# Patient Record
Sex: FEMALE | Race: WHITE | NOT HISPANIC OR LATINO | Employment: FULL TIME | URBAN - METROPOLITAN AREA
[De-identification: names, ages, dates, MRNs, and addresses within clinical notes are randomized per-mention and may not be internally consistent; named-entity substitution may affect disease eponyms.]

---

## 2023-03-28 DIAGNOSIS — I10 HYPERTENSION, UNSPECIFIED TYPE: ICD-10-CM

## 2023-03-28 RX ORDER — SPIRONOLACTONE 50 MG/1
50 TABLET, FILM COATED ORAL DAILY
COMMUNITY
Start: 2020-05-14 | End: 2023-03-28 | Stop reason: SDUPTHER

## 2023-03-29 RX ORDER — SPIRONOLACTONE 50 MG/1
50 TABLET, FILM COATED ORAL DAILY
Qty: 90 TABLET | Refills: 1 | Status: SHIPPED | OUTPATIENT
Start: 2023-03-29 | End: 2023-08-08 | Stop reason: SDUPTHER

## 2023-05-04 LAB
ANION GAP IN SER/PLAS: 13 MMOL/L (ref 10–20)
CALCIUM (MG/DL) IN SER/PLAS: 10.8 MG/DL (ref 8.6–10.6)
CARBON DIOXIDE, TOTAL (MMOL/L) IN SER/PLAS: 31 MMOL/L (ref 21–32)
CHLORIDE (MMOL/L) IN SER/PLAS: 96 MMOL/L (ref 98–107)
CREATININE (MG/DL) IN SER/PLAS: 1.11 MG/DL (ref 0.5–1.05)
GFR FEMALE: 54 ML/MIN/1.73M2
GLUCOSE (MG/DL) IN SER/PLAS: 118 MG/DL (ref 74–99)
POTASSIUM (MMOL/L) IN SER/PLAS: 4.3 MMOL/L (ref 3.5–5.3)
SODIUM (MMOL/L) IN SER/PLAS: 136 MMOL/L (ref 136–145)
UREA NITROGEN (MG/DL) IN SER/PLAS: 20 MG/DL (ref 6–23)

## 2023-08-07 DIAGNOSIS — E78.2 MIXED HYPERLIPIDEMIA: Primary | ICD-10-CM

## 2023-08-07 RX ORDER — LOVASTATIN 20 MG/1
20 TABLET ORAL NIGHTLY
COMMUNITY
End: 2023-08-07 | Stop reason: SDUPTHER

## 2023-08-08 ENCOUNTER — OFFICE VISIT (OUTPATIENT)
Dept: PRIMARY CARE | Facility: CLINIC | Age: 68
End: 2023-08-08
Payer: MEDICARE

## 2023-08-08 VITALS
DIASTOLIC BLOOD PRESSURE: 78 MMHG | SYSTOLIC BLOOD PRESSURE: 137 MMHG | OXYGEN SATURATION: 98 % | BODY MASS INDEX: 17.81 KG/M2 | HEART RATE: 83 BPM | HEIGHT: 66 IN | WEIGHT: 110.8 LBS

## 2023-08-08 DIAGNOSIS — E78.2 MIXED HYPERLIPIDEMIA: ICD-10-CM

## 2023-08-08 DIAGNOSIS — N18.31 STAGE 3A CHRONIC KIDNEY DISEASE (MULTI): ICD-10-CM

## 2023-08-08 DIAGNOSIS — I10 HYPERTENSION, UNSPECIFIED TYPE: ICD-10-CM

## 2023-08-08 DIAGNOSIS — Z00.00 MEDICARE ANNUAL WELLNESS VISIT, SUBSEQUENT: Primary | ICD-10-CM

## 2023-08-08 DIAGNOSIS — I10 PRIMARY HYPERTENSION: ICD-10-CM

## 2023-08-08 DIAGNOSIS — Z23 NEED FOR PNEUMOCOCCAL VACCINATION: ICD-10-CM

## 2023-08-08 DIAGNOSIS — M81.0 AGE-RELATED OSTEOPOROSIS WITHOUT CURRENT PATHOLOGICAL FRACTURE: ICD-10-CM

## 2023-08-08 PROCEDURE — 1160F RVW MEDS BY RX/DR IN RCRD: CPT | Performed by: STUDENT IN AN ORGANIZED HEALTH CARE EDUCATION/TRAINING PROGRAM

## 2023-08-08 PROCEDURE — 1170F FXNL STATUS ASSESSED: CPT | Performed by: STUDENT IN AN ORGANIZED HEALTH CARE EDUCATION/TRAINING PROGRAM

## 2023-08-08 PROCEDURE — 99214 OFFICE O/P EST MOD 30 MIN: CPT | Performed by: STUDENT IN AN ORGANIZED HEALTH CARE EDUCATION/TRAINING PROGRAM

## 2023-08-08 PROCEDURE — 1036F TOBACCO NON-USER: CPT | Performed by: STUDENT IN AN ORGANIZED HEALTH CARE EDUCATION/TRAINING PROGRAM

## 2023-08-08 PROCEDURE — 3078F DIAST BP <80 MM HG: CPT | Performed by: STUDENT IN AN ORGANIZED HEALTH CARE EDUCATION/TRAINING PROGRAM

## 2023-08-08 PROCEDURE — 3075F SYST BP GE 130 - 139MM HG: CPT | Performed by: STUDENT IN AN ORGANIZED HEALTH CARE EDUCATION/TRAINING PROGRAM

## 2023-08-08 PROCEDURE — 90677 PCV20 VACCINE IM: CPT | Performed by: STUDENT IN AN ORGANIZED HEALTH CARE EDUCATION/TRAINING PROGRAM

## 2023-08-08 PROCEDURE — 1159F MED LIST DOCD IN RCRD: CPT | Performed by: STUDENT IN AN ORGANIZED HEALTH CARE EDUCATION/TRAINING PROGRAM

## 2023-08-08 PROCEDURE — G0439 PPPS, SUBSEQ VISIT: HCPCS | Performed by: STUDENT IN AN ORGANIZED HEALTH CARE EDUCATION/TRAINING PROGRAM

## 2023-08-08 PROCEDURE — 1126F AMNT PAIN NOTED NONE PRSNT: CPT | Performed by: STUDENT IN AN ORGANIZED HEALTH CARE EDUCATION/TRAINING PROGRAM

## 2023-08-08 PROCEDURE — G0009 ADMIN PNEUMOCOCCAL VACCINE: HCPCS | Performed by: STUDENT IN AN ORGANIZED HEALTH CARE EDUCATION/TRAINING PROGRAM

## 2023-08-08 RX ORDER — AMLODIPINE BESYLATE 10 MG/1
10 TABLET ORAL DAILY
COMMUNITY
End: 2023-08-08 | Stop reason: SDUPTHER

## 2023-08-08 RX ORDER — SPIRONOLACTONE 50 MG/1
50 TABLET, FILM COATED ORAL DAILY
Qty: 90 TABLET | Refills: 3 | Status: SHIPPED | OUTPATIENT
Start: 2023-08-08

## 2023-08-08 RX ORDER — SERTRALINE HYDROCHLORIDE 100 MG/1
100 TABLET, FILM COATED ORAL DAILY
COMMUNITY
End: 2024-01-03 | Stop reason: SDUPTHER

## 2023-08-08 RX ORDER — LOVASTATIN 20 MG/1
20 TABLET ORAL NIGHTLY
Qty: 90 TABLET | Refills: 3 | Status: SHIPPED | OUTPATIENT
Start: 2023-08-08

## 2023-08-08 RX ORDER — LOVASTATIN 20 MG/1
20 TABLET ORAL NIGHTLY
Qty: 90 TABLET | Refills: 3 | Status: SHIPPED | OUTPATIENT
Start: 2023-08-08 | End: 2023-08-08 | Stop reason: SDUPTHER

## 2023-08-08 RX ORDER — AMLODIPINE BESYLATE 10 MG/1
10 TABLET ORAL DAILY
Qty: 90 TABLET | Refills: 3 | Status: SHIPPED | OUTPATIENT
Start: 2023-08-08

## 2023-08-08 ASSESSMENT — ACTIVITIES OF DAILY LIVING (ADL)
BATHING: INDEPENDENT
TAKING_MEDICATION: INDEPENDENT
DOING_HOUSEWORK: INDEPENDENT
GROCERY_SHOPPING: INDEPENDENT
DRESSING: INDEPENDENT
MANAGING_FINANCES: INDEPENDENT

## 2023-08-08 ASSESSMENT — PATIENT HEALTH QUESTIONNAIRE - PHQ9
1. LITTLE INTEREST OR PLEASURE IN DOING THINGS: NOT AT ALL
2. FEELING DOWN, DEPRESSED OR HOPELESS: NOT AT ALL
SUM OF ALL RESPONSES TO PHQ9 QUESTIONS 1 AND 2: 0

## 2023-08-08 NOTE — PROGRESS NOTES
"Subjective   Reason for Visit: Annamarie Tesfaye is an 67 y.o. female here for a Medicare Wellness visit.     Past Medical, Surgical, and Family History reviewed and updated in chart.    Reviewed all medications by prescribing practitioner or clinical pharmacist (such as prescriptions, OTCs, herbal therapies and supplements) and documented in the medical record.    HPI  66 y/o female with HTN, HPL   Patient Care Team:  Susy Santamaria MD as PCP - General (Family Medicine)  Sus[facility] Santamaria [facility] P[facility]     Review of Systems    Constitutional: no chills, no fever and no night sweats.     Eyes: no blurred vision and no eyesight problems.     ENT: no hearing loss, no nasal congestion, no nasal discharge, no hoarseness and no sore throat.     Cardiovascular: no chest pain, no intermittent leg claudication, no lower extremity edema, no palpitations and no syncope.     Respiratory: no cough, no shortness of breath during exertion, no shortness of breath at rest and no wheezing.     Gastrointestinal: no abdominal pain, no blood in stools, no constipation, no diarrhea, no melena, no nausea, no rectal pain and no vomiting.     Genitourinary: no dysuria, no change in urinary frequency, no urinary hesitancy, no feelings of urinary urgency and no vaginal discharge.     Musculoskeletal: no arthralgias, no back pain and no myalgias.     Integumentary: no new skin lesions and no rashes.     Neurological: no difficulty walking, no headache, no limb weakness, no numbness and no tingling.     Psychiatric: no anxiety, no depression, no anhedonia and no substance use disorders.     Endocrine: no recent weight gain and no recent weight loss.     Hematologic/Lymphatic: no tendency for easy bruising and no swollen glands.          All other systems have been reviewed and are negative for complaint.    Objective   Vitals:  /78   Pulse 83   Ht 1.67 m (5' 5.75\")   Wt 50.3 kg (110 lb 12.8 oz)   " SpO2 98%   BMI 18.02 kg/m²       Physical Exam  Constitutional: Alert and in no acute distress. Well developed, well nourished.     Eyes: Normal external exam. Pupils were equal in size, round, reactive to light (PERRL) with normal accommodation and extraocular movements intact (EOMI).     Ears, Nose, Mouth, and Throat: External inspection of ears and nose: Normal.  Otoscopic examination: Normal.      Neck: No neck mass was observed. Supple.     Cardiovascular: Heart rate and rhythm were normal, normal S1 and S2, no gallops, no murmurs and no pericardial rub    Pulmonary: No respiratory distress. Clear bilateral breath sounds.     Abdomen: Soft nontender; no abdominal mass palpated. No organomegaly.     Musculoskeletal: No joint swelling seen, normal movements of all extremities. Range of motion: Normal.  Muscle strength/tone: Normal.          Neurologic: Deep tendon reflexes were 2+ and symmetric. Sensation: Normal.     Psychiatric: Judgment and insight: Intact. Mood and affect: Normal.      Assessment/Plan   Problem List Items Addressed This Visit       Stage 3a chronic kidney disease (CMS/[facility])     Other Visit Diagnoses       Medicare annual wellness visit, subsequent    -  Primary    Relevant Orders    Hemoglobin A1C    Primary hypertension        Relevant Orders    CBC    Comprehensive Metabolic Panel    TSH with reflex to Free T4 if abnormal    Mixed hyperlipidemia        Relevant Medications    lovastatin (Mevacor) 20 mg tablet    Other Relevant Orders    Lipid Panel    Age-related osteoporosis without current pathological fracture        Relevant Orders    Vitamin D, Total    Hypertension, unspecified type        Relevant Medications    spironolactone (Aldactone) 50 mg tablet    amLODIPine (Norvasc) 10 mg tablet    Need for pneumococcal vaccination        Relevant Orders    Pneumococcal conjugate vaccine, 20-valent, adult (PREVNAR 20) (Completed)          67-year-old female with hypertension, anxiety with  depression, attention deficit, CKD stage3.     #Hypertension: At goal continue spironolactone 50 mg and amlodipine 10 mg. RTO in 6m or sooner if needed      # HPL : on statin   # Anxiety and depression : Stable on the current med     **Patient Discussion/Summary    Influenza: influenza vaccine was previously given.   Prevnar 20: Prevnar 20 vaccine  was given today   Shingles Vaccine: Shingles vaccine was previously given.   Breast cancer screening: Last done in 2020 per verbal report , pt would like to defer it to 5 years.   Cervical cancer screening: screening not indicated.   Osteoporosis screening: screening is current. Osteoporosis in 2022 dexa   Colorectal cancer screening: screening is current and 3.5 years ago at  , was advised to rpt in 10 years.  Done July 2021   HIV screening: screening not indicated.

## 2023-08-10 ENCOUNTER — LAB (OUTPATIENT)
Dept: LAB | Facility: LAB | Age: 68
End: 2023-08-10
Payer: MEDICARE

## 2023-08-10 DIAGNOSIS — I10 PRIMARY HYPERTENSION: ICD-10-CM

## 2023-08-10 DIAGNOSIS — M81.0 AGE-RELATED OSTEOPOROSIS WITHOUT CURRENT PATHOLOGICAL FRACTURE: ICD-10-CM

## 2023-08-10 DIAGNOSIS — Z00.00 MEDICARE ANNUAL WELLNESS VISIT, SUBSEQUENT: ICD-10-CM

## 2023-08-10 DIAGNOSIS — E78.2 MIXED HYPERLIPIDEMIA: ICD-10-CM

## 2023-08-10 LAB
ALANINE AMINOTRANSFERASE (SGPT) (U/L) IN SER/PLAS: 16 U/L (ref 7–45)
ALBUMIN (G/DL) IN SER/PLAS: 4.6 G/DL (ref 3.4–5)
ALKALINE PHOSPHATASE (U/L) IN SER/PLAS: 60 U/L (ref 33–136)
ANION GAP IN SER/PLAS: 14 MMOL/L (ref 10–20)
ASPARTATE AMINOTRANSFERASE (SGOT) (U/L) IN SER/PLAS: 18 U/L (ref 9–39)
BILIRUBIN TOTAL (MG/DL) IN SER/PLAS: 0.7 MG/DL (ref 0–1.2)
CALCIDIOL (25 OH VITAMIN D3) (NG/ML) IN SER/PLAS: 27 NG/ML
CALCIUM (MG/DL) IN SER/PLAS: 10.1 MG/DL (ref 8.6–10.6)
CARBON DIOXIDE, TOTAL (MMOL/L) IN SER/PLAS: 26 MMOL/L (ref 21–32)
CHLORIDE (MMOL/L) IN SER/PLAS: 101 MMOL/L (ref 98–107)
CHOLESTEROL (MG/DL) IN SER/PLAS: 181 MG/DL (ref 0–199)
CHOLESTEROL IN HDL (MG/DL) IN SER/PLAS: 67.9 MG/DL
CHOLESTEROL/HDL RATIO: 2.7
CREATININE (MG/DL) IN SER/PLAS: 1.1 MG/DL (ref 0.5–1.05)
ERYTHROCYTE DISTRIBUTION WIDTH (RATIO) BY AUTOMATED COUNT: 11.7 % (ref 11.5–14.5)
ERYTHROCYTE MEAN CORPUSCULAR HEMOGLOBIN CONCENTRATION (G/DL) BY AUTOMATED: 32.9 G/DL (ref 32–36)
ERYTHROCYTE MEAN CORPUSCULAR VOLUME (FL) BY AUTOMATED COUNT: 92 FL (ref 80–100)
ERYTHROCYTES (10*6/UL) IN BLOOD BY AUTOMATED COUNT: 4.63 X10E12/L (ref 4–5.2)
ESTIMATED AVERAGE GLUCOSE FOR HBA1C: 108 MG/DL
GFR FEMALE: 55 ML/MIN/1.73M2
GLUCOSE (MG/DL) IN SER/PLAS: 89 MG/DL (ref 74–99)
HEMATOCRIT (%) IN BLOOD BY AUTOMATED COUNT: 42.5 % (ref 36–46)
HEMOGLOBIN (G/DL) IN BLOOD: 14 G/DL (ref 12–16)
HEMOGLOBIN A1C/HEMOGLOBIN TOTAL IN BLOOD: 5.4 %
LDL: 102 MG/DL (ref 0–99)
LEUKOCYTES (10*3/UL) IN BLOOD BY AUTOMATED COUNT: 7 X10E9/L (ref 4.4–11.3)
NRBC (PER 100 WBCS) BY AUTOMATED COUNT: 0 /100 WBC (ref 0–0)
PLATELETS (10*3/UL) IN BLOOD AUTOMATED COUNT: 217 X10E9/L (ref 150–450)
POTASSIUM (MMOL/L) IN SER/PLAS: 4.5 MMOL/L (ref 3.5–5.3)
PROTEIN TOTAL: 7.5 G/DL (ref 6.4–8.2)
SODIUM (MMOL/L) IN SER/PLAS: 136 MMOL/L (ref 136–145)
THYROTROPIN (MIU/L) IN SER/PLAS BY DETECTION LIMIT <= 0.05 MIU/L: 2.58 MIU/L (ref 0.44–3.98)
TRIGLYCERIDE (MG/DL) IN SER/PLAS: 58 MG/DL (ref 0–149)
UREA NITROGEN (MG/DL) IN SER/PLAS: 17 MG/DL (ref 6–23)
VLDL: 12 MG/DL (ref 0–40)

## 2023-08-10 PROCEDURE — 82306 VITAMIN D 25 HYDROXY: CPT

## 2023-08-10 PROCEDURE — 36415 COLL VENOUS BLD VENIPUNCTURE: CPT

## 2023-08-10 PROCEDURE — 83036 HEMOGLOBIN GLYCOSYLATED A1C: CPT

## 2023-08-10 PROCEDURE — 80053 COMPREHEN METABOLIC PANEL: CPT

## 2023-08-10 PROCEDURE — 80061 LIPID PANEL: CPT

## 2023-08-10 PROCEDURE — 84443 ASSAY THYROID STIM HORMONE: CPT

## 2023-08-10 PROCEDURE — 85027 COMPLETE CBC AUTOMATED: CPT

## 2023-08-12 LAB
AMPHETAMINES,URINE: 862 NG/ML
MDA,URINE: <200 NG/ML
MDEA,URINE: <200 NG/ML
MDMA,UR: <200 NG/ML
METHAMPHETAMINE QUANTITATIVE URINE: <200 NG/ML
PHENTERMINE,UR: <200 NG/ML

## 2023-08-21 PROBLEM — I77.819 DILATION OF AORTA (CMS-HCC): Status: ACTIVE | Noted: 2023-08-21

## 2023-08-21 PROBLEM — R06.02 SOB (SHORTNESS OF BREATH) ON EXERTION: Status: ACTIVE | Noted: 2023-08-21

## 2023-08-21 PROBLEM — N28.89 RENAL MASS: Status: ACTIVE | Noted: 2023-08-21

## 2023-08-21 PROBLEM — K62.3 RECTAL PROLAPSE: Status: ACTIVE | Noted: 2017-06-30

## 2023-08-21 PROBLEM — H25.099 INCIPIENT SENILE CATARACT: Status: ACTIVE | Noted: 2023-08-21

## 2023-08-21 PROBLEM — H02.834 DERMATOCHALASIS OF BOTH UPPER EYELIDS: Status: ACTIVE | Noted: 2020-05-06

## 2023-08-21 PROBLEM — B97.7 HPV IN FEMALE: Status: ACTIVE | Noted: 2023-08-21

## 2023-08-21 PROBLEM — I10 HTN (HYPERTENSION): Status: ACTIVE | Noted: 2023-08-21

## 2023-08-21 PROBLEM — F98.8 ATTENTION DEFICIT DISORDER OF ADULT: Status: ACTIVE | Noted: 2023-08-21

## 2023-08-21 PROBLEM — M19.019 AC JOINT ARTHROPATHY: Status: ACTIVE | Noted: 2020-01-16

## 2023-08-21 PROBLEM — N13.30 HYDRONEPHROSIS: Status: ACTIVE | Noted: 2023-08-21

## 2023-08-21 PROBLEM — R00.2 PALPITATIONS: Status: ACTIVE | Noted: 2023-08-21

## 2023-08-21 PROBLEM — G24.5 BENIGN ESSENTIAL BLEPHAROSPASM: Status: ACTIVE | Noted: 2023-08-21

## 2023-08-21 PROBLEM — K59.09 CHRONIC CONSTIPATION: Status: ACTIVE | Noted: 2017-06-30

## 2023-08-21 PROBLEM — F41.9 ANXIETY: Status: ACTIVE | Noted: 2023-08-21

## 2023-08-21 PROBLEM — H02.831 DERMATOCHALASIS OF BOTH UPPER EYELIDS: Status: ACTIVE | Noted: 2020-05-06

## 2023-08-21 PROBLEM — F41.8 DEPRESSION WITH ANXIETY: Status: ACTIVE | Noted: 2023-08-21

## 2023-08-21 PROBLEM — R30.0 DYSURIA: Status: ACTIVE | Noted: 2023-08-21

## 2023-08-21 PROBLEM — N20.0 NEPHROLITHIASIS: Status: ACTIVE | Noted: 2023-08-21

## 2023-08-21 PROBLEM — H25.813 COMBINED FORM OF AGE-RELATED CATARACT, BOTH EYES: Status: ACTIVE | Noted: 2023-08-21

## 2023-08-21 PROBLEM — Z78.0 ASYMPTOMATIC MENOPAUSE: Status: ACTIVE | Noted: 2023-08-21

## 2023-08-21 RX ORDER — HYDROXYZINE HYDROCHLORIDE 25 MG/1
TABLET, FILM COATED ORAL
COMMUNITY
Start: 2022-06-16 | End: 2023-10-27 | Stop reason: ALTCHOICE

## 2023-08-21 RX ORDER — PHENAZOPYRIDINE HYDROCHLORIDE 200 MG/1
1 TABLET, FILM COATED ORAL 3 TIMES DAILY
COMMUNITY
Start: 2022-10-15 | End: 2023-10-27 | Stop reason: ALTCHOICE

## 2023-08-21 RX ORDER — DEXTROAMPHETAMINE SACCHARATE, AMPHETAMINE ASPARTATE, DEXTROAMPHETAMINE SULFATE AND AMPHETAMINE SULFATE 2.5; 2.5; 2.5; 2.5 MG/1; MG/1; MG/1; MG/1
1 TABLET ORAL DAILY
COMMUNITY
Start: 2022-06-20 | End: 2023-10-26 | Stop reason: SDUPTHER

## 2023-08-21 RX ORDER — CIPROFLOXACIN 250 MG/1
1 TABLET, FILM COATED ORAL 2 TIMES DAILY
COMMUNITY
Start: 2022-10-15 | End: 2023-10-27 | Stop reason: ALTCHOICE

## 2023-08-21 RX ORDER — SPIRONOLACTONE 25 MG/1
50 TABLET ORAL 2 TIMES DAILY
COMMUNITY
Start: 2021-09-09 | End: 2023-10-16 | Stop reason: DRUGHIGH

## 2023-10-02 ENCOUNTER — OFFICE VISIT (OUTPATIENT)
Dept: OTOLARYNGOLOGY | Facility: CLINIC | Age: 68
End: 2023-10-02
Payer: MEDICARE

## 2023-10-02 VITALS — WEIGHT: 109.6 LBS | HEIGHT: 66 IN | BODY MASS INDEX: 17.61 KG/M2

## 2023-10-02 DIAGNOSIS — G24.5 BENIGN ESSENTIAL BLEPHAROSPASM: Primary | ICD-10-CM

## 2023-10-02 PROCEDURE — 99202 OFFICE O/P NEW SF 15 MIN: CPT | Performed by: OTOLARYNGOLOGY

## 2023-10-02 PROCEDURE — 1160F RVW MEDS BY RX/DR IN RCRD: CPT | Performed by: OTOLARYNGOLOGY

## 2023-10-02 PROCEDURE — 1036F TOBACCO NON-USER: CPT | Performed by: OTOLARYNGOLOGY

## 2023-10-02 PROCEDURE — 1126F AMNT PAIN NOTED NONE PRSNT: CPT | Performed by: OTOLARYNGOLOGY

## 2023-10-02 PROCEDURE — 1159F MED LIST DOCD IN RCRD: CPT | Performed by: OTOLARYNGOLOGY

## 2023-10-02 NOTE — PROGRESS NOTES
Chief Complaint: Bilateral blepharospasm  Referring Provider: Dr. Hassan    HPI: Annamarie Tesfaye is a 67 y.o. female who was referred for evaluation of bilateral blepharospasm  Onset:   Side: bilateral  Primary concerns: Blepharospasm  Dry eye history: Yes  Previous interventions: Dysport, Botox and Xeomin    Last Xeomin August 2023, was getting it every two months with Dr. Hassan. She is to continue with me     A 14 organ review of systems was reviewed with the patient. Pertinent items are noted in HPI. The patient denies otalgia, odynophagia, dysphagia, dysarthria, voice changes, hemoptysis, or weight loss.    Past, family, and social history obtained but not pertinent to current problem.    Past Medical History  She has no past medical history on file.    Surgical History  She has a past surgical history that includes Other surgical history (03/11/2021).     Social History  She reports that she has never smoked. She has never used smokeless tobacco. She reports that she does not currently use alcohol. She reports that she does not currently use drugs.    Family History  Family History   Problem Relation Name Age of Onset    Other (alcohol use) Mother      Cataracts Mother      Hypertension Mother      Other (senile dementia) Mother      Alcohol abuse Father      Hypertension Father      Heart attack Father      Alcohol abuse Sister      Hypertension Sister      Other (CABG) Brother      Hypertension Brother      Alcohol abuse Son          Allergies  Lisinopril    Physical Exam    General: Well-developed and well-nourished in appearance.  Skin: No rashes or concerning lesions on the visible portions of the skin.  Eyes: Extraocular movements intact. Visual fields grossly normal. Baseline blepharospasm bialterally  Ears: Pinna are normal in shape and position. External canals are patent.  Nose: Dorsum is midline. Septum is midline and turbinates are normal on anterior rhinoscopy.  Oral Cavity/Oropharynx: Dentition  is intact. Mucous membranes moist.   Respiratory: No respiratory distress. Quiet breathing without stertor or stridor.  Cardiovascular: Regular rate and rhythm. Warm extremities with equal pulses.  Psych: Normal mood and affect. Judgement and insight appropriate.  Neuro: Alert and oriented. CN II-XII grossly intact. No focal deficits.  Musculoskeletal: Gait intact. Moves all extremities well without apparent deformities.    Assessment: This patient has right and left sided complete/incomplete facial paralysis. There is a profound physical deformity, with asymmetry of the face. Function is compromised with oral incompetence, air escape, difficulty communicating with others, eye irritation, and visual field obstruction.    Plan: We discussed options for treatment, including both injectable treatments, facial retraining, and surgical interventions. Each of these is is medically necessary.    Treatment plan: continue chemodenervation in setting of benign essential blepharospasm

## 2023-10-16 ENCOUNTER — PROCEDURE VISIT (OUTPATIENT)
Dept: OTOLARYNGOLOGY | Facility: CLINIC | Age: 68
End: 2023-10-16
Payer: MEDICARE

## 2023-10-16 VITALS — WEIGHT: 112.2 LBS | BODY MASS INDEX: 18.03 KG/M2 | HEIGHT: 66 IN

## 2023-10-16 DIAGNOSIS — G24.5 BENIGN ESSENTIAL BLEPHAROSPASM: Primary | ICD-10-CM

## 2023-10-16 PROCEDURE — 99212 OFFICE O/P EST SF 10 MIN: CPT | Performed by: OTOLARYNGOLOGY

## 2023-10-16 PROCEDURE — 64612 DESTROY NERVE FACE MUSCLE: CPT | Performed by: OTOLARYNGOLOGY

## 2023-10-16 ASSESSMENT — PATIENT HEALTH QUESTIONNAIRE - PHQ9
2. FEELING DOWN, DEPRESSED OR HOPELESS: NOT AT ALL
SUM OF ALL RESPONSES TO PHQ9 QUESTIONS 1 & 2: 0
1. LITTLE INTEREST OR PLEASURE IN DOING THINGS: NOT AT ALL

## 2023-10-16 NOTE — PROGRESS NOTES
INDICATIONS AND CONSENT  Annamarie Tesfaye presents with benign blepharospasm. The patient was offered the above procedures and after the risks, benefits, alternatives, and limitations to the procedure were discussed, the patient elected to proceed.  Risks discussed included but were not limited to bruising, lack of effect, ptosis, or an undesirable outcome including contour irregularities.  Informed consent was obtained.     DESCRIPTION OF PROCEDURE:  Using sterile technique, the injections were performed as follows:     Botox was injected into the bl orbicularis oculi and corrugators.  A total of 42 units was injected.    The precise locations and amounts were recorded on the facial map.  The patient tolerated the procedure well without any complications.            Chief Complaint: Bilateral blepharospasm  Referring Provider: Dr. Hassan    HPI: Annamarie Tesfaye is a 67 y.o. female who was referred for evaluation of bilateral blepharospasm  Onset:   Side: bilateral  Primary concerns: Blepharospasm  Dry eye history: Yes  Previous interventions: Dysport, Botox and Xeomin    Last Xeomin August 2023, was getting it every two months with Dr. Hassan. She is to continue with me     A 14 organ review of systems was reviewed with the patient. Pertinent items are noted in HPI. The patient denies otalgia, odynophagia, dysphagia, dysarthria, voice changes, hemoptysis, or weight loss.    Past, family, and social history obtained but not pertinent to current problem.    Past Medical History  She has no past medical history on file.    Surgical History  She has a past surgical history that includes Other surgical history (03/11/2021).     Social History  She reports that she has never smoked. She has never used smokeless tobacco. She reports that she does not currently use alcohol. She reports that she does not currently use drugs.    Family History  Family History   Problem Relation Name Age of Onset    Other (alcohol use) Mother       Cataracts Mother      Hypertension Mother      Other (senile dementia) Mother      Alcohol abuse Father      Hypertension Father      Heart attack Father      Alcohol abuse Sister      Hypertension Sister      Other (CABG) Brother      Hypertension Brother      Alcohol abuse Son          Allergies  Lisinopril    Physical Exam    General: Well-developed and well-nourished in appearance.  Skin: No rashes or concerning lesions on the visible portions of the skin.  Eyes: Extraocular movements intact. Visual fields grossly normal. Baseline blepharospasm bialterally  Ears: Pinna are normal in shape and position. External canals are patent.  Nose: Dorsum is midline. Septum is midline and turbinates are normal on anterior rhinoscopy.  Oral Cavity/Oropharynx: Dentition is intact. Mucous membranes moist.   Respiratory: No respiratory distress. Quiet breathing without stertor or stridor.  Cardiovascular: Regular rate and rhythm. Warm extremities with equal pulses.  Psych: Normal mood and affect. Judgement and insight appropriate.  Neuro: Alert and oriented. CN II-XII grossly intact. No focal deficits.  Musculoskeletal: Gait intact. Moves all extremities well without apparent deformities.    Assessment: This patient has right and left sided complete/incomplete facial paralysis. There is a profound physical deformity, with asymmetry of the face. Function is compromised with oral incompetence, air escape, difficulty communicating with others, eye irritation, and visual field obstruction.    Plan: We discussed options for treatment, including both injectable treatments, facial retraining, and surgical interventions. Each of these is is medically necessary.    Treatment plan: continue chemodenervation in setting of benign essential blepharospasm

## 2023-10-24 DIAGNOSIS — H53.8 BLURRY VISION, BILATERAL: Primary | ICD-10-CM

## 2023-10-26 ENCOUNTER — TELEPHONE (OUTPATIENT)
Dept: OTHER | Age: 68
End: 2023-10-26
Payer: MEDICARE

## 2023-10-26 DIAGNOSIS — F98.8 ATTENTION DEFICIT DISORDER, UNSPECIFIED HYPERACTIVITY PRESENCE: Primary | ICD-10-CM

## 2023-10-26 RX ORDER — DEXTROAMPHETAMINE SACCHARATE, AMPHETAMINE ASPARTATE, DEXTROAMPHETAMINE SULFATE AND AMPHETAMINE SULFATE 2.5; 2.5; 2.5; 2.5 MG/1; MG/1; MG/1; MG/1
1 TABLET ORAL DAILY
Qty: 30 TABLET | Refills: 0 | Status: SHIPPED | OUTPATIENT
Start: 2023-10-26 | End: 2023-11-27 | Stop reason: SDUPTHER

## 2023-10-27 ENCOUNTER — OFFICE VISIT (OUTPATIENT)
Dept: CARDIOLOGY | Facility: CLINIC | Age: 68
End: 2023-10-27
Payer: MEDICARE

## 2023-10-27 VITALS
WEIGHT: 113 LBS | SYSTOLIC BLOOD PRESSURE: 138 MMHG | HEIGHT: 66 IN | BODY MASS INDEX: 18.16 KG/M2 | DIASTOLIC BLOOD PRESSURE: 84 MMHG | HEART RATE: 76 BPM | OXYGEN SATURATION: 96 %

## 2023-10-27 DIAGNOSIS — R07.9 CHEST PAIN, UNSPECIFIED TYPE: Primary | ICD-10-CM

## 2023-10-27 PROCEDURE — 3075F SYST BP GE 130 - 139MM HG: CPT | Performed by: INTERNAL MEDICINE

## 2023-10-27 PROCEDURE — 1036F TOBACCO NON-USER: CPT | Performed by: INTERNAL MEDICINE

## 2023-10-27 PROCEDURE — 3079F DIAST BP 80-89 MM HG: CPT | Performed by: INTERNAL MEDICINE

## 2023-10-27 PROCEDURE — 99214 OFFICE O/P EST MOD 30 MIN: CPT | Performed by: INTERNAL MEDICINE

## 2023-10-27 PROCEDURE — 1159F MED LIST DOCD IN RCRD: CPT | Performed by: INTERNAL MEDICINE

## 2023-10-27 PROCEDURE — 1126F AMNT PAIN NOTED NONE PRSNT: CPT | Performed by: INTERNAL MEDICINE

## 2023-10-27 PROCEDURE — 99214 OFFICE O/P EST MOD 30 MIN: CPT | Mod: PO | Performed by: INTERNAL MEDICINE

## 2023-10-27 PROCEDURE — 1160F RVW MEDS BY RX/DR IN RCRD: CPT | Performed by: INTERNAL MEDICINE

## 2023-10-27 ASSESSMENT — ENCOUNTER SYMPTOMS
ALTERED MENTAL STATUS: 0
HEMATURIA: 0
MYALGIAS: 0
MEMORY LOSS: 0
DEPRESSION: 0
CONSTIPATION: 0
COUGH: 0
ABDOMINAL PAIN: 0
CHILLS: 0
FALLS: 0
DIARRHEA: 0
FEVER: 0
BLOATING: 0
HEADACHES: 0
NAUSEA: 0
WHEEZING: 0
DYSURIA: 0
VOMITING: 0
HEMOPTYSIS: 0

## 2023-10-27 ASSESSMENT — PAIN SCALES - GENERAL: PAINLEVEL: 0-NO PAIN

## 2023-10-27 NOTE — PROGRESS NOTES
Chief complaint:  CP/Fhx CAD     HPI  68 yo WF w/ h/o HTN, HLD, FHx CAD now here for cardiology FU. Long h/o occ exertional CP/dyspnea x couple minutes (ie shoveling snow or when dogs pull her on leash). No palps. +occ brief LH on standing up. No syncope. No LE edema. No claudication. No cough.   ECG 8/15: SR (71)  ECG 10/21: SR (78), PRP  Echo 12/13: TDS, EF 60%, nl diastole, nl RV, mild MR  Echo 9/15: EF 63%  ETT 12/13: no ischemia  ETT 9/15: bord ST changes1  CXR 10/20: no acute abnl   CT uro 5/23: no AAA, no HGS    Review of Systems   Constitutional: Negative for chills, fever and malaise/fatigue.   HENT:  Negative for hearing loss.    Eyes:  Negative for visual disturbance.   Respiratory:  Negative for cough, hemoptysis and wheezing.    Skin:  Negative for rash.   Musculoskeletal:  Negative for falls and myalgias.   Gastrointestinal:  Negative for bloating, abdominal pain, constipation, diarrhea, dysphagia, nausea and vomiting.   Genitourinary:  Negative for dysuria and hematuria.   Neurological:  Negative for headaches.   Psychiatric/Behavioral:  Negative for altered mental status, depression and memory loss.         Social History     Tobacco Use    Smoking status: Never    Smokeless tobacco: Never   Substance Use Topics    Alcohol use: Not Currently        Family History   Problem Relation Name Age of Onset    Other (alcohol use) Mother      Cataracts Mother      Hypertension Mother      Other (senile dementia) Mother      Alcohol abuse Father      Hypertension Father      Heart attack Father      Alcohol abuse Sister      Hypertension Sister      Other (CABG) Brother      Hypertension Brother      Alcohol abuse Son          Allergies   Allergen Reactions    Lisinopril Unknown     Renal failure about 2 weeks post initialing tx with lisinopril.        Current Outpatient Medications   Medication Instructions    amLODIPine (NORVASC) 10 mg, oral, Daily    amphetamine-dextroamphetamine (Adderall) 10 mg tablet 10  mg, oral, Daily, Every morning     CALCIUM ORAL oral    cyanocobalamin, vitamin B-12, (VITAMIN B-12 ORAL) oral    lovastatin (MEVACOR) 20 mg, oral, Nightly    sertraline (ZOLOFT) 100 mg, oral, Daily    spironolactone (ALDACTONE) 50 mg, oral, Daily        Vitals:    10/27/23 1449   BP: 138/84   Pulse: 76   SpO2: 96%        Physical Exam  Constitutional:       Appearance: Normal appearance.   HENT:      Head: Normocephalic and atraumatic.      Nose: Nose normal.   Cardiovascular:      Rate and Rhythm: Normal rate and regular rhythm.      Heart sounds: No murmur heard.  Pulmonary:      Effort: Pulmonary effort is normal.      Breath sounds: Normal breath sounds.   Abdominal:      Palpations: Abdomen is soft.      Tenderness: There is no abdominal tenderness.   Musculoskeletal:      Right lower leg: No edema.      Left lower leg: No edema.   Skin:     General: Skin is warm and dry.   Neurological:      General: No focal deficit present.      Mental Status: She is alert.   Psychiatric:         Mood and Affect: Mood normal.         Judgment: Judgment normal.          Lab Results   Component Value Date    CR 1.10      HGB 14.0      K 4.5      MG      BNP No results found for requested labs within last 365 days.            LDL      TSH       Assessment/Plan   66 yo WF w/ h/o HTN, HLD, FHx CAD now w/ long hx of occ exertional CP of unclear etiology. Check LADARIUS and CT cardiac score.  -continue Amlodipine 10 every day  -continue Román 50 every day   -continue Lovastatin 20 every day  -FU PRN    Low Mayer MD

## 2023-11-07 ENCOUNTER — TELEMEDICINE (OUTPATIENT)
Dept: BEHAVIORAL HEALTH | Facility: CLINIC | Age: 68
End: 2023-11-07
Payer: MEDICARE

## 2023-11-07 DIAGNOSIS — F98.8 ATTENTION DEFICIT DISORDER, UNSPECIFIED HYPERACTIVITY PRESENCE: ICD-10-CM

## 2023-11-07 PROCEDURE — 99214 OFFICE O/P EST MOD 30 MIN: CPT | Performed by: PSYCHIATRY & NEUROLOGY

## 2023-11-07 ASSESSMENT — ENCOUNTER SYMPTOMS
CONSTITUTIONAL NEGATIVE: 1
NEUROLOGICAL NEGATIVE: 1

## 2023-11-07 NOTE — PROGRESS NOTES
Subjective   Patient ID: Annamarie Tesfaye is a 67 y.o. female who presents for No chief complaint on file. I do not think my medications are working.     The patient reports that she feels her medication is not working sufficiently. She reports that she is still too distracted.       Review of Systems   Constitutional: Negative.    Neurological: Negative.    Psychiatric/Behavioral:          Still has difficulty with concentration at Adderall 10 mg daily.    All other systems reviewed and are negative.      Objective   Physical Exam  Constitutional:       Appearance: Normal appearance.   HENT:      Head: Normocephalic.      Nose: Nose normal.   Neurological:      General: No focal deficit present.      Mental Status: She is alert and oriented to person, place, and time. Mental status is at baseline.   Psychiatric:         Mood and Affect: Mood normal.         Behavior: Behavior normal.         Thought Content: Thought content normal.         Judgment: Judgment normal.      Comments: Continues to manifest concentration difficulties with more complex tasks at work.          Lab Review:       Assessment/Plan   The patient continues to have trouble with concentration at her work. The plan is to increase generic Adderall to 15 mg daily and follow up in 4 weeks.

## 2023-11-09 DIAGNOSIS — F41.8 DEPRESSION WITH ANXIETY: ICD-10-CM

## 2023-11-09 DIAGNOSIS — F98.8 ATTENTION DEFICIT DISORDER OF ADULT: ICD-10-CM

## 2023-11-10 ENCOUNTER — HOSPITAL ENCOUNTER (OUTPATIENT)
Dept: CARDIOLOGY | Facility: CLINIC | Age: 68
Discharge: HOME | End: 2023-11-10
Payer: MEDICARE

## 2023-11-10 DIAGNOSIS — R07.9 CHEST PAIN, UNSPECIFIED TYPE: ICD-10-CM

## 2023-11-10 PROCEDURE — 93350 STRESS TTE ONLY: CPT | Performed by: INTERNAL MEDICINE

## 2023-11-10 PROCEDURE — 93016 CV STRESS TEST SUPVJ ONLY: CPT | Performed by: INTERNAL MEDICINE

## 2023-11-10 PROCEDURE — 93018 CV STRESS TEST I&R ONLY: CPT | Performed by: INTERNAL MEDICINE

## 2023-11-10 PROCEDURE — 93350 STRESS TTE ONLY: CPT

## 2023-11-16 ENCOUNTER — TELEPHONE (OUTPATIENT)
Dept: BEHAVIORAL HEALTH | Facility: CLINIC | Age: 68
End: 2023-11-16
Payer: MEDICARE

## 2023-11-16 ENCOUNTER — PATIENT MESSAGE (OUTPATIENT)
Dept: BEHAVIORAL HEALTH | Facility: CLINIC | Age: 68
End: 2023-11-16
Payer: MEDICARE

## 2023-11-16 DIAGNOSIS — F98.8 ATTENTION DEFICIT DISORDER, UNSPECIFIED HYPERACTIVITY PRESENCE: ICD-10-CM

## 2023-11-24 ENCOUNTER — ANCILLARY PROCEDURE (OUTPATIENT)
Dept: RADIOLOGY | Facility: CLINIC | Age: 68
End: 2023-11-24
Payer: MEDICARE

## 2023-11-24 DIAGNOSIS — R07.9 CHEST PAIN, UNSPECIFIED TYPE: ICD-10-CM

## 2023-11-24 PROCEDURE — 75571 CT HRT W/O DYE W/CA TEST: CPT

## 2023-11-27 ENCOUNTER — TELEMEDICINE (OUTPATIENT)
Dept: BEHAVIORAL HEALTH | Facility: CLINIC | Age: 68
End: 2023-11-27
Payer: MEDICARE

## 2023-11-27 DIAGNOSIS — F41.9 ANXIETY: ICD-10-CM

## 2023-11-27 DIAGNOSIS — F98.8 ATTENTION DEFICIT DISORDER, UNSPECIFIED HYPERACTIVITY PRESENCE: ICD-10-CM

## 2023-11-27 DIAGNOSIS — F98.8 ATTENTION DEFICIT DISORDER OF ADULT: ICD-10-CM

## 2023-11-27 DIAGNOSIS — F41.8 DEPRESSION WITH ANXIETY: ICD-10-CM

## 2023-11-27 PROCEDURE — 99213 OFFICE O/P EST LOW 20 MIN: CPT | Performed by: PSYCHIATRY & NEUROLOGY

## 2023-11-27 RX ORDER — DEXTROAMPHETAMINE SACCHARATE, AMPHETAMINE ASPARTATE, DEXTROAMPHETAMINE SULFATE AND AMPHETAMINE SULFATE 2.5; 2.5; 2.5; 2.5 MG/1; MG/1; MG/1; MG/1
10 TABLET ORAL 2 TIMES DAILY
Qty: 60 TABLET | Refills: 0 | Status: SHIPPED | OUTPATIENT
Start: 2023-11-27 | End: 2023-11-29 | Stop reason: WASHOUT

## 2023-11-27 SDOH — ECONOMIC STABILITY: HOUSING INSECURITY
IN THE LAST 12 MONTHS, WAS THERE A TIME WHEN YOU DID NOT HAVE A STEADY PLACE TO SLEEP OR SLEPT IN A SHELTER (INCLUDING NOW)?: NO

## 2023-11-27 SDOH — ECONOMIC STABILITY: FOOD INSECURITY: WITHIN THE PAST 12 MONTHS, THE FOOD YOU BOUGHT JUST DIDN'T LAST AND YOU DIDN'T HAVE MONEY TO GET MORE.: NEVER TRUE

## 2023-11-27 SDOH — ECONOMIC STABILITY: FOOD INSECURITY: WITHIN THE PAST 12 MONTHS, YOU WORRIED THAT YOUR FOOD WOULD RUN OUT BEFORE YOU GOT MONEY TO BUY MORE.: NEVER TRUE

## 2023-11-27 SDOH — HEALTH STABILITY: PHYSICAL HEALTH: ON AVERAGE, HOW MANY DAYS PER WEEK DO YOU ENGAGE IN MODERATE TO STRENUOUS EXERCISE (LIKE A BRISK WALK)?: 1 DAY

## 2023-11-27 SDOH — ECONOMIC STABILITY: INCOME INSECURITY: IN THE LAST 12 MONTHS, WAS THERE A TIME WHEN YOU WERE NOT ABLE TO PAY THE MORTGAGE OR RENT ON TIME?: NO

## 2023-11-27 SDOH — ECONOMIC STABILITY: GENERAL
WHICH OF THE FOLLOWING DO YOU KNOW HOW TO USE AND HAVE ACCESS TO EVERY DAY? (CHOOSE ALL THAT APPLY): SMARTPHONE WITH CELLULAR DATA PLAN

## 2023-11-27 SDOH — HEALTH STABILITY: PHYSICAL HEALTH: ON AVERAGE, HOW MANY MINUTES DO YOU ENGAGE IN EXERCISE AT THIS LEVEL?: 60 MIN

## 2023-11-27 SDOH — ECONOMIC STABILITY: GENERAL
WHICH OF THE FOLLOWING WOULD YOU LIKE TO GET CONNECTED TO IN ORDER TO RECEIVE A DISCOUNT OR FOR FREE? (CHOOSE ALL THAT APPLY): DIGITAL/INTERNET SKILLS TRAINING

## 2023-11-27 SDOH — ECONOMIC STABILITY: TRANSPORTATION INSECURITY
IN THE PAST 12 MONTHS, HAS LACK OF TRANSPORTATION KEPT YOU FROM MEETINGS, WORK, OR FROM GETTING THINGS NEEDED FOR DAILY LIVING?: NO

## 2023-11-27 SDOH — ECONOMIC STABILITY: TRANSPORTATION INSECURITY
IN THE PAST 12 MONTHS, HAS THE LACK OF TRANSPORTATION KEPT YOU FROM MEDICAL APPOINTMENTS OR FROM GETTING MEDICATIONS?: NO

## 2023-11-27 ASSESSMENT — SOCIAL DETERMINANTS OF HEALTH (SDOH)
ARE YOU MARRIED, WIDOWED, DIVORCED, SEPARATED, NEVER MARRIED, OR LIVING WITH A PARTNER?: NEVER MARRIED
WITHIN THE LAST YEAR, HAVE YOU BEEN AFRAID OF YOUR PARTNER OR EX-PARTNER?: NO
DO YOU BELONG TO ANY CLUBS OR ORGANIZATIONS SUCH AS CHURCH GROUPS UNIONS, FRATERNAL OR ATHLETIC GROUPS, OR SCHOOL GROUPS?: YES
WITHIN THE LAST YEAR, HAVE YOU BEEN KICKED, HIT, SLAPPED, OR OTHERWISE PHYSICALLY HURT BY YOUR PARTNER OR EX-PARTNER?: NO
HOW OFTEN DO YOU GET TOGETHER WITH FRIENDS OR RELATIVES?: MORE THAN THREE TIMES A WEEK
HOW OFTEN DO YOU ATTENT MEETINGS OF THE CLUB OR ORGANIZATION YOU BELONG TO?: MORE THAN 4 TIMES PER YEAR
IN A TYPICAL WEEK, HOW MANY TIMES DO YOU TALK ON THE PHONE WITH FAMILY, FRIENDS, OR NEIGHBORS?: MORE THAN THREE TIMES A WEEK
WITHIN THE LAST YEAR, HAVE TO BEEN RAPED OR FORCED TO HAVE ANY KIND OF SEXUAL ACTIVITY BY YOUR PARTNER OR EX-PARTNER?: NO
WITHIN THE LAST YEAR, HAVE YOU BEEN HUMILIATED OR EMOTIONALLY ABUSED IN OTHER WAYS BY YOUR PARTNER OR EX-PARTNER?: NO
HOW OFTEN DO YOU ATTEND CHURCH OR RELIGIOUS SERVICES?: NEVER
HOW HARD IS IT FOR YOU TO PAY FOR THE VERY BASICS LIKE FOOD, HOUSING, MEDICAL CARE, AND HEATING?: NOT HARD AT ALL
IN THE PAST 12 MONTHS, HAS THE ELECTRIC, GAS, OIL, OR WATER COMPANY THREATENED TO SHUT OFF SERVICE IN YOUR HOME?: NO

## 2023-11-27 ASSESSMENT — LIFESTYLE VARIABLES
HOW OFTEN DO YOU HAVE A DRINK CONTAINING ALCOHOL: NEVER
HOW MANY STANDARD DRINKS CONTAINING ALCOHOL DO YOU HAVE ON A TYPICAL DAY: PATIENT DOES NOT DRINK
HOW OFTEN DO YOU HAVE SIX OR MORE DRINKS ON ONE OCCASION: NEVER
SKIP TO QUESTIONS 9-10: 1
AUDIT-C TOTAL SCORE: 0

## 2023-11-27 ASSESSMENT — ENCOUNTER SYMPTOMS: PSYCHIATRIC NEGATIVE: 1

## 2023-11-27 NOTE — PROGRESS NOTES
Subjective   Patient ID: Annamarie Tesfaye is a 67 y.o. female who presents for No chief complaint on file. I am doing better.     The patient is alert, fully oriented, language is intact, and recent and remote memory intact. The patient denies any suicidal or homicidal ideation or plans. The patient presents with no depressive, manic or psychotic symptoms. Thought is logical and clear. No disturbances of judgment or insight are exhibited. No behavioral disturbances are present on examination.  The patient reports that he is doing better.       Review of Systems   Neurological:         The patient is alert, fully oriented, language is intact, and recent and remote memory intact. The patient denies any suicidal or homicidal ideation or plans. The patient presents with no depressive, manic or psychotic symptoms. Thought is logical and clear. No disturbances of judgment or insight are exhibited. No behavioral disturbances are present on examination.  The patient reports that he is doing better.      Psychiatric/Behavioral: Negative.          The patient is alert, fully oriented, language is intact, and recent and remote memory intact. The patient denies any suicidal or homicidal ideation or plans. The patient presents with no depressive, manic or psychotic symptoms. Thought is logical and clear. No disturbances of judgment or insight are exhibited. No behavioral disturbances are present on examination.  The patient reports that he is doing better.      All other systems reviewed and are negative.      Objective   Physical Exam  Constitutional:       Appearance: Normal appearance.   Neurological:      General: No focal deficit present.      Mental Status: She is alert and oriented to person, place, and time. Mental status is at baseline.      Comments: The patient is alert, fully oriented, language is intact, and recent and remote memory intact. The patient denies any suicidal or homicidal ideation or plans. The patient  presents with no depressive, manic or psychotic symptoms. Thought is logical and clear. No disturbances of judgment or insight are exhibited. No behavioral disturbances are present on examination.  The patient reports that he is doing better.      Psychiatric:         Mood and Affect: Mood normal.         Behavior: Behavior normal.         Thought Content: Thought content normal.         Judgment: Judgment normal.      Comments: The patient is alert, fully oriented, language is intact, and recent and remote memory intact. The patient denies any suicidal or homicidal ideation or plans. The patient presents with no depressive, manic or psychotic symptoms. Thought is logical and clear. No disturbances of judgment or insight are exhibited. No behavioral disturbances are present on examination.  The patient reports that he is doing better.            Lab Review:       Assessment/Plan   The risks, benefits & alternatives to the medications prescribed were explained to you today. You were able to understand & repeat these risks, benefits & alternatives to this prescribed medication. You have agreed to proceed with treatment with the medications discussed based on the conclusion that the benefit outweighs the risks of this treatment regimen: increase Adderall generic to 10 mg morning and early afternoon daily and continue sertraline 100 mg daily with food. Follow up in person in three months.

## 2023-11-29 DIAGNOSIS — F98.8 ATTENTION DEFICIT DISORDER (ADD) WITHOUT HYPERACTIVITY: ICD-10-CM

## 2023-11-29 PROBLEM — K59.09 CHRONIC CONSTIPATION: Status: RESOLVED | Noted: 2017-06-30 | Resolved: 2023-11-29

## 2023-11-29 PROBLEM — K62.3 RECTAL PROLAPSE: Status: RESOLVED | Noted: 2017-06-30 | Resolved: 2023-11-29

## 2023-11-29 PROBLEM — R30.0 DYSURIA: Status: RESOLVED | Noted: 2023-08-21 | Resolved: 2023-11-29

## 2023-11-29 PROBLEM — M19.019 AC JOINT ARTHROPATHY: Status: RESOLVED | Noted: 2020-01-16 | Resolved: 2023-11-29

## 2023-11-29 PROBLEM — B97.7 HPV IN FEMALE: Status: RESOLVED | Noted: 2023-08-21 | Resolved: 2023-11-29

## 2023-11-29 RX ORDER — NEOMYCIN SULFATE, POLYMYXIN B SULFATE AND DEXAMETHASONE 3.5; 10000; 1 MG/ML; [USP'U]/ML; MG/ML
SUSPENSION/ DROPS OPHTHALMIC
COMMUNITY
Start: 2023-11-22

## 2023-11-29 RX ORDER — KETOROLAC TROMETHAMINE 5 MG/ML
SOLUTION OPHTHALMIC
COMMUNITY
Start: 2023-11-22 | End: 2024-03-29 | Stop reason: WASHOUT

## 2023-11-29 RX ORDER — DEXTROAMPHETAMINE SACCHARATE, AMPHETAMINE ASPARTATE MONOHYDRATE, DEXTROAMPHETAMINE SULFATE AND AMPHETAMINE SULFATE 5; 5; 5; 5 MG/1; MG/1; MG/1; MG/1
20 CAPSULE, EXTENDED RELEASE ORAL DAILY
Qty: 30 CAPSULE | Refills: 0 | Status: SHIPPED | OUTPATIENT
Start: 2023-11-29 | End: 2023-12-30 | Stop reason: SDUPTHER

## 2023-12-04 ENCOUNTER — OFFICE VISIT (OUTPATIENT)
Dept: OTOLARYNGOLOGY | Facility: CLINIC | Age: 68
End: 2023-12-04
Payer: MEDICARE

## 2023-12-04 ENCOUNTER — APPOINTMENT (OUTPATIENT)
Dept: BEHAVIORAL HEALTH | Facility: CLINIC | Age: 68
End: 2023-12-04
Payer: MEDICARE

## 2023-12-04 VITALS — WEIGHT: 110.6 LBS | BODY MASS INDEX: 17.78 KG/M2 | HEIGHT: 66 IN

## 2023-12-04 DIAGNOSIS — G24.5 BENIGN ESSENTIAL BLEPHAROSPASM: Primary | ICD-10-CM

## 2023-12-04 PROCEDURE — 64612 DESTROY NERVE FACE MUSCLE: CPT | Performed by: OTOLARYNGOLOGY

## 2023-12-04 PROCEDURE — 1036F TOBACCO NON-USER: CPT | Performed by: OTOLARYNGOLOGY

## 2023-12-04 PROCEDURE — 1159F MED LIST DOCD IN RCRD: CPT | Performed by: OTOLARYNGOLOGY

## 2023-12-04 PROCEDURE — 1160F RVW MEDS BY RX/DR IN RCRD: CPT | Performed by: OTOLARYNGOLOGY

## 2023-12-04 PROCEDURE — 1126F AMNT PAIN NOTED NONE PRSNT: CPT | Performed by: OTOLARYNGOLOGY

## 2023-12-04 NOTE — PROGRESS NOTES
INDICATIONS AND CONSENT  Annamarie Tesfaye presents with benign blepharospasm. The patient was offered the above procedures and after the risks, benefits, alternatives, and limitations to the procedure were discussed, the patient elected to proceed.  Risks discussed included but were not limited to bruising, lack of effect, ptosis, or an undesirable outcome including contour irregularities.  Informed consent was obtained.     DESCRIPTION OF PROCEDURE:  Using sterile technique, the injections were performed as follows:     Xeomin was injected into the bl orbicularis oculi and corrugators.  A total of 56 units was injected. 44 units was wasted.    The precise locations and amounts were recorded on the facial map.  The patient tolerated the procedure well without any complications.      Chief Complaint: Bilateral blepharospasm  Referring Provider: Dr. Hassan    HPI: Annamarie Tesfaye is a 67 y.o. female who was referred for evaluation of bilateral blepharospasm  Onset:   Side: bilateral  Primary concerns: Blepharospasm  Dry eye history: Yes  Previous interventions: Dysport, Botox and Xeomin    Last Xeomin August 2023, was getting it every two months with Dr. Hassan. She is to continue with me     A 14 organ review of systems was reviewed with the patient. Pertinent items are noted in HPI. The patient denies otalgia, odynophagia, dysphagia, dysarthria, voice changes, hemoptysis, or weight loss.    Past, family, and social history obtained but not pertinent to current problem.    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    Surgical History  She has a past surgical history that includes Other surgical history (03/11/2021).     Social History  She reports that she has never smoked. She has never used smokeless tobacco. She  reports that she does not currently use alcohol. She reports that she does not use drugs.    Family History  Family History   Problem Relation Name Age of Onset    Other (alcohol use) Mother      Cataracts Mother      Hypertension Mother      Other (senile dementia) Mother      Alcohol abuse Father      Hypertension Father      Heart attack Father      Alcohol abuse Sister      Hypertension Sister      Other (CABG) Brother      Hypertension Brother      Alcohol abuse Son          Allergies  Lisinopril    Physical Exam    General: Well-developed and well-nourished in appearance.  Skin: No rashes or concerning lesions on the visible portions of the skin.  Eyes: Extraocular movements intact. Visual fields grossly normal. Baseline blepharospasm bialterally  Ears: Pinna are normal in shape and position. External canals are patent.  Nose: Dorsum is midline. Septum is midline and turbinates are normal on anterior rhinoscopy.  Oral Cavity/Oropharynx: Dentition is intact. Mucous membranes moist.   Respiratory: No respiratory distress. Quiet breathing without stertor or stridor.  Cardiovascular: Regular rate and rhythm. Warm extremities with equal pulses.  Psych: Normal mood and affect. Judgement and insight appropriate.  Neuro: Alert and oriented. CN II-XII grossly intact. No focal deficits.  Musculoskeletal: Gait intact. Moves all extremities well without apparent deformities.    Assessment: This patient has right and left sided complete/incomplete facial paralysis. There is a profound physical deformity, with asymmetry of the face. Function is compromised with oral incompetence, air escape, difficulty communicating with others, eye irritation, and visual field obstruction.    Plan: We discussed options for treatment, including both injectable treatments, facial retraining, and surgical interventions. Each of these is is medically necessary.    Treatment plan: continue chemodenervation in setting of benign essential  blepharospasm

## 2023-12-18 ENCOUNTER — APPOINTMENT (OUTPATIENT)
Dept: OTOLARYNGOLOGY | Facility: CLINIC | Age: 68
End: 2023-12-18
Payer: MEDICARE

## 2023-12-20 ENCOUNTER — APPOINTMENT (OUTPATIENT)
Dept: OPHTHALMOLOGY | Facility: CLINIC | Age: 68
End: 2023-12-20
Payer: MEDICARE

## 2023-12-28 ENCOUNTER — APPOINTMENT (OUTPATIENT)
Dept: OTOLARYNGOLOGY | Facility: CLINIC | Age: 68
End: 2023-12-28
Payer: MEDICARE

## 2023-12-30 DIAGNOSIS — F98.8 ATTENTION DEFICIT DISORDER (ADD) WITHOUT HYPERACTIVITY: ICD-10-CM

## 2023-12-30 RX ORDER — DEXTROAMPHETAMINE SACCHARATE, AMPHETAMINE ASPARTATE MONOHYDRATE, DEXTROAMPHETAMINE SULFATE AND AMPHETAMINE SULFATE 5; 5; 5; 5 MG/1; MG/1; MG/1; MG/1
20 CAPSULE, EXTENDED RELEASE ORAL DAILY
Qty: 30 CAPSULE | Refills: 0 | Status: SHIPPED | OUTPATIENT
Start: 2023-12-30 | End: 2024-01-22 | Stop reason: WASHOUT

## 2024-01-03 DIAGNOSIS — F41.8 DEPRESSION WITH ANXIETY: ICD-10-CM

## 2024-01-03 RX ORDER — SERTRALINE HYDROCHLORIDE 100 MG/1
100 TABLET, FILM COATED ORAL DAILY
Qty: 30 TABLET | Refills: 0 | OUTPATIENT
Start: 2024-01-03

## 2024-01-03 RX ORDER — SERTRALINE HYDROCHLORIDE 100 MG/1
100 TABLET, FILM COATED ORAL DAILY
Qty: 90 TABLET | Refills: 1 | Status: SHIPPED | OUTPATIENT
Start: 2024-01-03 | End: 2024-07-01

## 2024-01-22 DIAGNOSIS — F98.8 ATTENTION DEFICIT DISORDER OF ADULT: ICD-10-CM

## 2024-01-22 RX ORDER — DEXTROAMPHETAMINE SACCHARATE, AMPHETAMINE ASPARTATE, DEXTROAMPHETAMINE SULFATE AND AMPHETAMINE SULFATE 2.5; 2.5; 2.5; 2.5 MG/1; MG/1; MG/1; MG/1
10 TABLET ORAL 2 TIMES DAILY
Qty: 60 TABLET | Refills: 0 | Status: SHIPPED | OUTPATIENT
Start: 2024-01-22 | End: 2024-02-19 | Stop reason: SDUPTHER

## 2024-01-26 NOTE — PROGRESS NOTES
XEOMIN injection    Annamarie Tesfaye presents with benign blepharospasm. The patient was offered the above procedures and after the risks, benefits, alternatives, and limitations to the procedure were discussed, the patient elected to proceed.  Risks discussed included but were not limited to bruising, lack of effect, ptosis, or an undesirable outcome including contour irregularities.  Informed consent was obtained.     2/5/24:  DESCRIPTION OF PROCEDURE:  Using sterile technique, the injections were performed as follows:     Xeomin was injected into the bl orbicularis oculi and corrugators..  A total of 56 units was injected. 44 units wasted    The precise locations and amounts were recorded on the facial map.  The patient tolerated the procedure well without any complications.      ----------------------------------------------------------------------    10/5/23: Xeomin was injected into the bl orbicularis oculi and corrugators.  A total of 56 units was injected. 44 units was wasted.

## 2024-02-01 ENCOUNTER — APPOINTMENT (OUTPATIENT)
Dept: OPHTHALMOLOGY | Facility: CLINIC | Age: 69
End: 2024-02-01
Payer: MEDICARE

## 2024-02-05 ENCOUNTER — OFFICE VISIT (OUTPATIENT)
Dept: OTOLARYNGOLOGY | Facility: CLINIC | Age: 69
End: 2024-02-05
Payer: MEDICARE

## 2024-02-05 VITALS — HEIGHT: 66 IN | WEIGHT: 112 LBS | BODY MASS INDEX: 18 KG/M2

## 2024-02-05 DIAGNOSIS — G24.5 BENIGN ESSENTIAL BLEPHAROSPASM: Primary | ICD-10-CM

## 2024-02-05 PROCEDURE — 64612 DESTROY NERVE FACE MUSCLE: CPT | Performed by: OTOLARYNGOLOGY

## 2024-02-05 PROCEDURE — 1036F TOBACCO NON-USER: CPT | Performed by: OTOLARYNGOLOGY

## 2024-02-05 PROCEDURE — 1126F AMNT PAIN NOTED NONE PRSNT: CPT | Performed by: OTOLARYNGOLOGY

## 2024-02-05 PROCEDURE — 1159F MED LIST DOCD IN RCRD: CPT | Performed by: OTOLARYNGOLOGY

## 2024-02-05 ASSESSMENT — PATIENT HEALTH QUESTIONNAIRE - PHQ9
1. LITTLE INTEREST OR PLEASURE IN DOING THINGS: NOT AT ALL
SUM OF ALL RESPONSES TO PHQ9 QUESTIONS 1 AND 2: 0
2. FEELING DOWN, DEPRESSED OR HOPELESS: NOT AT ALL

## 2024-02-07 ENCOUNTER — APPOINTMENT (OUTPATIENT)
Dept: PRIMARY CARE | Facility: CLINIC | Age: 69
End: 2024-02-07
Payer: MEDICARE

## 2024-02-09 ENCOUNTER — TELEMEDICINE (OUTPATIENT)
Dept: BEHAVIORAL HEALTH | Facility: CLINIC | Age: 69
End: 2024-02-09
Payer: MEDICARE

## 2024-02-09 DIAGNOSIS — F98.8 ATTENTION DEFICIT DISORDER OF ADULT: ICD-10-CM

## 2024-02-09 DIAGNOSIS — F41.9 ANXIETY AND DEPRESSION: ICD-10-CM

## 2024-02-09 DIAGNOSIS — F98.8 ATTENTION DEFICIT DISORDER (ADD) WITHOUT HYPERACTIVITY: ICD-10-CM

## 2024-02-09 DIAGNOSIS — F32.A ANXIETY AND DEPRESSION: ICD-10-CM

## 2024-02-09 DIAGNOSIS — F41.8 DEPRESSION WITH ANXIETY: ICD-10-CM

## 2024-02-09 PROCEDURE — 1126F AMNT PAIN NOTED NONE PRSNT: CPT | Performed by: PSYCHIATRY & NEUROLOGY

## 2024-02-09 PROCEDURE — 1036F TOBACCO NON-USER: CPT | Performed by: PSYCHIATRY & NEUROLOGY

## 2024-02-09 PROCEDURE — 99213 OFFICE O/P EST LOW 20 MIN: CPT | Performed by: PSYCHIATRY & NEUROLOGY

## 2024-02-09 PROCEDURE — 1159F MED LIST DOCD IN RCRD: CPT | Performed by: PSYCHIATRY & NEUROLOGY

## 2024-02-09 SDOH — ECONOMIC STABILITY: HOUSING INSECURITY: IN THE LAST 12 MONTHS, HOW MANY PLACES HAVE YOU LIVED?: 1

## 2024-02-09 SDOH — ECONOMIC STABILITY: INCOME INSECURITY: IN THE LAST 12 MONTHS, WAS THERE A TIME WHEN YOU WERE NOT ABLE TO PAY THE MORTGAGE OR RENT ON TIME?: NO

## 2024-02-09 ASSESSMENT — ENCOUNTER SYMPTOMS: PSYCHIATRIC NEGATIVE: 1

## 2024-02-09 NOTE — PROGRESS NOTES
Subjective   Patient ID: Annamarie Tesfaye is a 68 y.o. female who presents for No chief complaint on file. I am doing better.     The patient engaged in a telehealth session via Epic audio visual or phone with this provider practicing within the [facility]. The identity of the patient was verified by their date of birth and last four digits of their social security number. The provider demonstrated that confidentially was preserved at their location. The patient was informed that they were responsible for ensuring confidentially was secured at their location. The patient's location was documented for emergency purposes. The patient was informed of the necessary steps that would occur if an emergency was to occur or technology failed during session.     The patient is alert, fully oriented, language is intact, and recent and remote memory intact. The patient denies any suicidal or homicidal ideation or plans. The patient presents with no depressive, manic or psychotic symptoms. Thought is logical and clear. No disturbances of judgment or insight are exhibited. No behavioral disturbances are present on examination.    The patient reports that he is doing better.       Review of Systems   Neurological:         The patient is alert, fully oriented, language is intact, and recent and remote memory intact. The patient denies any suicidal or homicidal ideation or plans. The patient presents with no depressive, manic or psychotic symptoms. Thought is logical and clear. No disturbances of judgment or insight are exhibited. No behavioral disturbances are present on examination.  The patient reports that he is doing better.      Psychiatric/Behavioral: Negative.          The patient is alert, fully oriented, language is intact, and recent and remote memory intact. The patient denies any suicidal or homicidal ideation or plans. The patient presents with no depressive, manic or psychotic symptoms. Thought is logical and clear.  No disturbances of judgment or insight are exhibited. No behavioral disturbances are present on examination.  The patient reports that he is doing better.      All other systems reviewed and are negative.      Objective   Physical Exam  Constitutional:       Appearance: Normal appearance.   Neurological:      General: No focal deficit present.      Mental Status: She is alert and oriented to person, place, and time. Mental status is at baseline.      Comments: The patient is alert, fully oriented, language is intact, and recent and remote memory intact. The patient denies any suicidal or homicidal ideation or plans. The patient presents with no depressive, manic or psychotic symptoms. Thought is logical and clear. No disturbances of judgment or insight are exhibited. No behavioral disturbances are present on examination.  The patient reports that he is doing better.      Psychiatric:         Mood and Affect: Mood normal.         Behavior: Behavior normal.         Thought Content: Thought content normal.         Judgment: Judgment normal.      Comments: The patient is alert, fully oriented, language is intact, and recent and remote memory intact. The patient denies any suicidal or homicidal ideation or plans. The patient presents with no depressive, manic or psychotic symptoms. Thought is logical and clear. No disturbances of judgment or insight are exhibited. No behavioral disturbances are present on examination.  The patient reports that he is doing better.            Lab Review:       Assessment/Plan   The risks, benefits & alternatives to the medications prescribed were explained to you today. You were able to understand & repeat these risks, benefits & alternatives to this prescribed medication. You have agreed to proceed with treatment with the medications discussed based on the conclusion that the benefit outweighs the risks of this treatment regimen: increase Adderall generic to 10 mg morning and early  afternoon daily and continue sertraline 100 mg daily with food. Follow up in person in three months at [facility] with plan to do your urine toxicology screen and your substance contract all of which are due in August of 2024.

## 2024-02-13 ENCOUNTER — OFFICE VISIT (OUTPATIENT)
Dept: PRIMARY CARE | Facility: CLINIC | Age: 69
End: 2024-02-13
Payer: MEDICARE

## 2024-02-13 VITALS
HEART RATE: 66 BPM | SYSTOLIC BLOOD PRESSURE: 140 MMHG | BODY MASS INDEX: 17.98 KG/M2 | WEIGHT: 111.4 LBS | DIASTOLIC BLOOD PRESSURE: 80 MMHG | OXYGEN SATURATION: 99 %

## 2024-02-13 DIAGNOSIS — N18.31 STAGE 3A CHRONIC KIDNEY DISEASE (MULTI): ICD-10-CM

## 2024-02-13 DIAGNOSIS — I10 PRIMARY HYPERTENSION: Primary | ICD-10-CM

## 2024-02-13 DIAGNOSIS — Z00.00 MEDICARE ANNUAL WELLNESS VISIT, SUBSEQUENT: ICD-10-CM

## 2024-02-13 DIAGNOSIS — I77.819 DILATION OF AORTA (CMS-HCC): ICD-10-CM

## 2024-02-13 DIAGNOSIS — E55.9 VITAMIN D INSUFFICIENCY: ICD-10-CM

## 2024-02-13 PROCEDURE — 1159F MED LIST DOCD IN RCRD: CPT | Performed by: STUDENT IN AN ORGANIZED HEALTH CARE EDUCATION/TRAINING PROGRAM

## 2024-02-13 PROCEDURE — 1160F RVW MEDS BY RX/DR IN RCRD: CPT | Performed by: STUDENT IN AN ORGANIZED HEALTH CARE EDUCATION/TRAINING PROGRAM

## 2024-02-13 PROCEDURE — 3079F DIAST BP 80-89 MM HG: CPT | Performed by: STUDENT IN AN ORGANIZED HEALTH CARE EDUCATION/TRAINING PROGRAM

## 2024-02-13 PROCEDURE — 3077F SYST BP >= 140 MM HG: CPT | Performed by: STUDENT IN AN ORGANIZED HEALTH CARE EDUCATION/TRAINING PROGRAM

## 2024-02-13 PROCEDURE — 1036F TOBACCO NON-USER: CPT | Performed by: STUDENT IN AN ORGANIZED HEALTH CARE EDUCATION/TRAINING PROGRAM

## 2024-02-13 PROCEDURE — 1126F AMNT PAIN NOTED NONE PRSNT: CPT | Performed by: STUDENT IN AN ORGANIZED HEALTH CARE EDUCATION/TRAINING PROGRAM

## 2024-02-13 PROCEDURE — 99214 OFFICE O/P EST MOD 30 MIN: CPT | Performed by: STUDENT IN AN ORGANIZED HEALTH CARE EDUCATION/TRAINING PROGRAM

## 2024-02-13 RX ORDER — SPIRONOLACTONE 25 MG/1
25 TABLET ORAL DAILY
Qty: 90 TABLET | Refills: 1 | Status: SHIPPED | OUTPATIENT
Start: 2024-02-13 | End: 2025-02-12

## 2024-02-13 ASSESSMENT — PATIENT HEALTH QUESTIONNAIRE - PHQ9
2. FEELING DOWN, DEPRESSED OR HOPELESS: NOT AT ALL
SUM OF ALL RESPONSES TO PHQ9 QUESTIONS 1 AND 2: 0
1. LITTLE INTEREST OR PLEASURE IN DOING THINGS: NOT AT ALL

## 2024-02-13 NOTE — PROGRESS NOTES
Subjective   Patient ID: Annamarie Tesfaye is a 68 y.o. female who presents for Follow-up (6 month).        HPI  68-year-old female with hypertension, anxiety with depression, attention deficit, CKD stage3.     Rpt BP as noted in vitals     Visit Vitals  /80   Pulse 66   Wt 50.5 kg (111 lb 6.4 oz)   SpO2 99%   BMI 17.98 kg/m²   OB Status Postmenopausal   Smoking Status Never   BSA 1.53 m²      No LMP recorded. Patient is postmenopausal.     Review of Systems    Constitutional : No feeling poorly / fevers/ chills / night sweats/ fatigue   Cardiovascular : No CP /Palpitations/ lower extremity edema / syncope   Respiratory : No Cough /SALDIVAR/Dyspnea at rest   Gastrointestinal : No abd pain / N/V  No bloody stools/ melena / constipation  Endo : No polyuria/polydipsia/ muscle weakness / sluggishness   CNS: No confusion / HA/ tingling/ numbness/ weakness of extremities  Psychiatric: No anxiety/ depression/ SI/HI    All other systems have been reviewed and are negative for complaint       Physical Exam    Constitutional : Vitals reviewed. Alert and in no distress  Cardiovascular : RRR, Normal S1, S2, No pericardial rub/ gallop, no peripheral edema   Pulmonary: No respiratory distress, CTAB   MSK : Normal gait and station , strength and tone     Neurologic : CNs 2-12 grossly intact , no obvious FNDs  Psych : A,Ox3, normal mood and affect      Assessment/Plan   Diagnoses and all orders for this visit:  Primary hypertension  -     spironolactone (Aldactone) 25 mg tablet; Take 1 tablet (25 mg) by mouth once daily. Along with the 50mg  -     Basic Metabolic Panel; Future  -     CBC; Future  -     Comprehensive Metabolic Panel; Future  -     Lipid Panel; Future  -     TSH with reflex to Free T4 if abnormal; Future  Dilation of aorta (CMS/HCC)  Stage 3a chronic kidney disease (CMS/HCC)  -     Basic Metabolic Panel; Future  -     Albumin , Urine Random; Future  Vitamin D insufficiency  -     Vitamin D 25-Hydroxy,Total (for eval of  Vitamin D levels); Future  Medicare annual wellness visit, subsequent  -     Hemoglobin A1C; Future      68-year-old female with hypertension, anxiety with depression, attention deficit, CKD stage3.     HTN : not at goal   Increased spironolactone to 75mg , 50 + 25mg     CKD stage 3    : rpt BMP today   Rpt again in 2m since we are increasing spironolactone dose     RTO in August for CPE , labs to be done prior the visit   Conditions addressed and mgmt as noted above.  Pertinent labs, images/ imaging reports , chart review was done .   Age appropriate labs / labs for mgmt of chronic medical conditions ordered, further mgmt pending the results.

## 2024-02-19 ENCOUNTER — LAB (OUTPATIENT)
Dept: LAB | Facility: LAB | Age: 69
End: 2024-02-19
Payer: MEDICARE

## 2024-02-19 DIAGNOSIS — F98.8 ATTENTION DEFICIT DISORDER OF ADULT: ICD-10-CM

## 2024-02-19 DIAGNOSIS — N18.31 STAGE 3A CHRONIC KIDNEY DISEASE (MULTI): ICD-10-CM

## 2024-02-19 LAB
ANION GAP SERPL CALC-SCNC: 16 MMOL/L (ref 10–20)
BUN SERPL-MCNC: 11 MG/DL (ref 6–23)
CALCIUM SERPL-MCNC: 10.4 MG/DL (ref 8.6–10.6)
CHLORIDE SERPL-SCNC: 96 MMOL/L (ref 98–107)
CO2 SERPL-SCNC: 30 MMOL/L (ref 21–32)
CREAT SERPL-MCNC: 0.98 MG/DL (ref 0.5–1.05)
EGFRCR SERPLBLD CKD-EPI 2021: 63 ML/MIN/1.73M*2
GLUCOSE SERPL-MCNC: 148 MG/DL (ref 74–99)
POTASSIUM SERPL-SCNC: 3.9 MMOL/L (ref 3.5–5.3)
SODIUM SERPL-SCNC: 138 MMOL/L (ref 136–145)

## 2024-02-19 PROCEDURE — 36415 COLL VENOUS BLD VENIPUNCTURE: CPT

## 2024-02-19 PROCEDURE — 80048 BASIC METABOLIC PNL TOTAL CA: CPT

## 2024-02-19 RX ORDER — DEXTROAMPHETAMINE SACCHARATE, AMPHETAMINE ASPARTATE, DEXTROAMPHETAMINE SULFATE AND AMPHETAMINE SULFATE 2.5; 2.5; 2.5; 2.5 MG/1; MG/1; MG/1; MG/1
10 TABLET ORAL 2 TIMES DAILY
Qty: 60 TABLET | Refills: 0 | Status: SHIPPED | OUTPATIENT
Start: 2024-02-19 | End: 2024-03-14 | Stop reason: SDUPTHER

## 2024-03-14 DIAGNOSIS — F98.8 ATTENTION DEFICIT DISORDER OF ADULT: ICD-10-CM

## 2024-03-14 RX ORDER — DEXTROAMPHETAMINE SACCHARATE, AMPHETAMINE ASPARTATE, DEXTROAMPHETAMINE SULFATE AND AMPHETAMINE SULFATE 2.5; 2.5; 2.5; 2.5 MG/1; MG/1; MG/1; MG/1
10 TABLET ORAL 2 TIMES DAILY
Qty: 60 TABLET | Refills: 0 | Status: SHIPPED | OUTPATIENT
Start: 2024-03-19 | End: 2024-04-15 | Stop reason: SDUPTHER

## 2024-03-14 RX ORDER — DEXTROAMPHETAMINE SACCHARATE, AMPHETAMINE ASPARTATE, DEXTROAMPHETAMINE SULFATE AND AMPHETAMINE SULFATE 2.5; 2.5; 2.5; 2.5 MG/1; MG/1; MG/1; MG/1
10 TABLET ORAL 2 TIMES DAILY
Qty: 60 TABLET | Refills: 0 | OUTPATIENT
Start: 2024-03-14 | End: 2024-04-13

## 2024-03-27 ENCOUNTER — LAB (OUTPATIENT)
Dept: LAB | Facility: LAB | Age: 69
End: 2024-03-27
Payer: MEDICARE

## 2024-03-27 DIAGNOSIS — I10 PRIMARY HYPERTENSION: ICD-10-CM

## 2024-03-27 LAB
ANION GAP SERPL CALC-SCNC: 15 MMOL/L (ref 10–20)
BUN SERPL-MCNC: 14 MG/DL (ref 6–23)
CALCIUM SERPL-MCNC: 10.6 MG/DL (ref 8.6–10.6)
CHLORIDE SERPL-SCNC: 94 MMOL/L (ref 98–107)
CO2 SERPL-SCNC: 31 MMOL/L (ref 21–32)
CREAT SERPL-MCNC: 1.15 MG/DL (ref 0.5–1.05)
EGFRCR SERPLBLD CKD-EPI 2021: 52 ML/MIN/1.73M*2
GLUCOSE SERPL-MCNC: 89 MG/DL (ref 74–99)
POTASSIUM SERPL-SCNC: 4.2 MMOL/L (ref 3.5–5.3)
SODIUM SERPL-SCNC: 136 MMOL/L (ref 136–145)

## 2024-03-27 PROCEDURE — 36415 COLL VENOUS BLD VENIPUNCTURE: CPT

## 2024-03-27 PROCEDURE — 80048 BASIC METABOLIC PNL TOTAL CA: CPT

## 2024-03-29 NOTE — PROGRESS NOTES
Facial Plastic & Reconstructive Surgery    Dx: Bilateral Blepharospasm    4/1/24 XEOMIN Injection    INDICATIONS AND CONSENT  Annamarie Tesfaye presents with bilateral blepharospasm. The patient was offered the above procedures and after the risks, benefits, alternatives, and limitations to the procedure were discussed, the patient elected to proceed.  Risks discussed included but were not limited to bruising, lack of effect, ptosis, or an undesirable outcome including contour irregularities.  Informed consent was obtained.     DESCRIPTION OF PROCEDURE:  Using sterile technique, the injections were performed as follows:     Xeomin was injected into the ***.  A total of *** units was injected by ***. *** units were wasted.     The precise locations and amounts were recorded on the facial map.  The patient tolerated the procedure well without any complications.     Jennifer Shahid PA-C     -------------------------------------------------------    2/5/24: XEOMIN injection    Annamarie Tesfaye presents with benign blepharospasm. The patient was offered the above procedures and after the risks, benefits, alternatives, and limitations to the procedure were discussed, the patient elected to proceed.  Risks discussed included but were not limited to bruising, lack of effect, ptosis, or an undesirable outcome including contour irregularities.  Informed consent was obtained.     DESCRIPTION OF PROCEDURE:  Using sterile technique, the injections were performed as follows:     Xeomin was injected into the bl orbicularis oculi and corrugators..  A total of 56 units was injected. 44 units wasted    The precise locations and amounts were recorded on the facial map.  The patient tolerated the procedure well without any complications.      ----------------------------------------------------------------------    10/5/23: Xeomin was injected into the bl orbicularis oculi and corrugators.  A total of 56 units was injected. 44 units was  wasted.

## 2024-04-01 ENCOUNTER — PROCEDURE VISIT (OUTPATIENT)
Dept: OTOLARYNGOLOGY | Facility: CLINIC | Age: 69
End: 2024-04-01
Payer: MEDICARE

## 2024-04-01 PROBLEM — Q67.0 FACIAL ASYMMETRIES: Status: ACTIVE | Noted: 2024-04-01

## 2024-04-01 NOTE — PROGRESS NOTES
Facial Plastic & Reconstructive Surgery      Dx: Bilateral Blepharospasm, facial spasms     4/3/24 XEOMIN  Injection   INDICATIONS AND CONSENT  Annamarie Tesfaye presents with bilateral blepharospasm. The patient was offered the above procedures and after the risks, benefits, alternatives, and limitations to the procedure were discussed, the patient elected to proceed.  Risks discussed included but were not limited to bruising, lack of effect, ptosis, or an undesirable outcome including contour irregularities.  Informed consent was obtained.      DESCRIPTION OF PROCEDURE:  Using sterile technique, the injections were performed as follows:      Xeomin was injected into the bilateral orbicularis oris, bilateral corrugators, bilateral orbicularis oculi, bilateral procerus .  A total of 70 units was injected. 30 units were wasted.      The precise locations and amounts were recorded on the facial map.  The patient tolerated the procedure well without any complications.         -------------------------------------------------------     2/5/24: XEOMIN injection   Annamarie Tesfaye presents with benign blepharospasm. The patient was offered the above procedures and after the risks, benefits, alternatives, and limitations to the procedure were discussed, the patient elected to proceed.  Risks discussed included but were not limited to bruising, lack of effect, ptosis, or an undesirable outcome including contour irregularities.  Informed consent was obtained.      DESCRIPTION OF PROCEDURE:  Using sterile technique, the injections were performed as follows:      Xeomin was injected into the bl orbicularis oculi and corrugators..  A total of 56 units was injected. 44 units wasted     The precise locations and amounts were recorded on the facial map.  The patient tolerated the procedure well without any complications.       ----------------------------------------------------------------------     10/5/23: Xeomin was injected into  the bl orbicularis oculi and corrugators.  A total of 56 units was injected. 44 units was wasted.

## 2024-04-03 ENCOUNTER — PROCEDURE VISIT (OUTPATIENT)
Dept: OTOLARYNGOLOGY | Facility: CLINIC | Age: 69
End: 2024-04-03
Payer: MEDICARE

## 2024-04-03 VITALS
DIASTOLIC BLOOD PRESSURE: 89 MMHG | WEIGHT: 112 LBS | BODY MASS INDEX: 18 KG/M2 | HEART RATE: 81 BPM | SYSTOLIC BLOOD PRESSURE: 143 MMHG | HEIGHT: 66 IN

## 2024-04-03 DIAGNOSIS — G24.5 BENIGN ESSENTIAL BLEPHAROSPASM: Primary | ICD-10-CM

## 2024-04-03 DIAGNOSIS — Q67.0 FACIAL ASYMMETRIES: ICD-10-CM

## 2024-04-03 PROCEDURE — 64612 DESTROY NERVE FACE MUSCLE: CPT | Performed by: OTOLARYNGOLOGY

## 2024-04-03 ASSESSMENT — PATIENT HEALTH QUESTIONNAIRE - PHQ9
2. FEELING DOWN, DEPRESSED OR HOPELESS: NOT AT ALL
1. LITTLE INTEREST OR PLEASURE IN DOING THINGS: NOT AT ALL
SUM OF ALL RESPONSES TO PHQ9 QUESTIONS 1 AND 2: 0

## 2024-04-15 DIAGNOSIS — F98.8 ATTENTION DEFICIT DISORDER OF ADULT: ICD-10-CM

## 2024-04-15 RX ORDER — DEXTROAMPHETAMINE SACCHARATE, AMPHETAMINE ASPARTATE, DEXTROAMPHETAMINE SULFATE AND AMPHETAMINE SULFATE 2.5; 2.5; 2.5; 2.5 MG/1; MG/1; MG/1; MG/1
10 TABLET ORAL 2 TIMES DAILY
Qty: 60 TABLET | Refills: 0 | Status: SHIPPED | OUTPATIENT
Start: 2024-04-15 | End: 2024-05-15 | Stop reason: SDUPTHER

## 2024-04-15 RX ORDER — DEXTROAMPHETAMINE SACCHARATE, AMPHETAMINE ASPARTATE, DEXTROAMPHETAMINE SULFATE AND AMPHETAMINE SULFATE 2.5; 2.5; 2.5; 2.5 MG/1; MG/1; MG/1; MG/1
10 TABLET ORAL 2 TIMES DAILY
Qty: 60 TABLET | Refills: 0 | OUTPATIENT
Start: 2024-04-15 | End: 2024-05-15

## 2024-04-16 DIAGNOSIS — F98.8 ATTENTION DEFICIT DISORDER OF ADULT: ICD-10-CM

## 2024-04-24 ENCOUNTER — APPOINTMENT (OUTPATIENT)
Dept: OTOLARYNGOLOGY | Facility: CLINIC | Age: 69
End: 2024-04-24
Payer: MEDICARE

## 2024-04-29 ENCOUNTER — TELEMEDICINE (OUTPATIENT)
Dept: OTOLARYNGOLOGY | Facility: CLINIC | Age: 69
End: 2024-04-29
Payer: MEDICARE

## 2024-04-29 DIAGNOSIS — G24.5 BLEPHAROSPASM: Primary | ICD-10-CM

## 2024-04-29 PROCEDURE — 1159F MED LIST DOCD IN RCRD: CPT | Performed by: OTOLARYNGOLOGY

## 2024-04-29 PROCEDURE — 99442 PR PHYS/QHP TELEPHONE EVALUATION 11-20 MIN: CPT | Performed by: OTOLARYNGOLOGY

## 2024-04-29 PROCEDURE — 1160F RVW MEDS BY RX/DR IN RCRD: CPT | Performed by: OTOLARYNGOLOGY

## 2024-04-30 NOTE — PROGRESS NOTES
Facial Plastic & Reconstructive Surgery    Returns for VV 4/30/24  Unfortunately limited response to last injection with xeomin with results fading after 2-3 weeks. We discussed trial of dysport over xeomin in the future. She has already failed to achieve results with botox as well.    Will re-attempt in 4 weeks with dysport instead of xeomin.    This visit was conducted by means of a two-way synchronous audio-only connection, and technical limitations of providing care through this modality, including that audio-only telephone technology may not always be HIPAA-compliant, were explained to the patient. Patient has explicitly consented to this telephone visit. I communicated with the the patient on the phone during this visit, and more than 50% of the time was spent in counseling and coordination of care.    Total time spent on this visit: 15    Dx: Bilateral Blepharospasm, facial spasms     4/3/24 XEOMIN  Injection   INDICATIONS AND CONSENT  Annamarie Tesfaye presents with bilateral blepharospasm. The patient was offered the above procedures and after the risks, benefits, alternatives, and limitations to the procedure were discussed, the patient elected to proceed.  Risks discussed included but were not limited to bruising, lack of effect, ptosis, or an undesirable outcome including contour irregularities.  Informed consent was obtained.      DESCRIPTION OF PROCEDURE:  Using sterile technique, the injections were performed as follows:      Xeomin was injected into the bilateral orbicularis oris, bilateral corrugators, bilateral orbicularis oculi, bilateral procerus .  A total of 70 units was injected. 30 units were wasted.      The precise locations and amounts were recorded on the facial map.  The patient tolerated the procedure well without any complications.         -------------------------------------------------------     2/5/24: XEOMIN injection   Annamarie Tesfaye presents with benign blepharospasm. The patient  was offered the above procedures and after the risks, benefits, alternatives, and limitations to the procedure were discussed, the patient elected to proceed.  Risks discussed included but were not limited to bruising, lack of effect, ptosis, or an undesirable outcome including contour irregularities.  Informed consent was obtained.      DESCRIPTION OF PROCEDURE:  Using sterile technique, the injections were performed as follows:      Xeomin was injected into the bl orbicularis oculi and corrugators..  A total of 56 units was injected. 44 units wasted     The precise locations and amounts were recorded on the facial map.  The patient tolerated the procedure well without any complications.       ----------------------------------------------------------------------     10/5/23: Xeomin was injected into the bl orbicularis oculi and corrugators.  A total of 56 units was injected. 44 units was wasted.

## 2024-05-06 ENCOUNTER — APPOINTMENT (OUTPATIENT)
Dept: OTOLARYNGOLOGY | Facility: CLINIC | Age: 69
End: 2024-05-06
Payer: MEDICARE

## 2024-05-15 ENCOUNTER — TELEPHONE (OUTPATIENT)
Dept: BEHAVIORAL HEALTH | Facility: CLINIC | Age: 69
End: 2024-05-15

## 2024-05-15 ENCOUNTER — APPOINTMENT (OUTPATIENT)
Dept: UROLOGY | Facility: CLINIC | Age: 69
End: 2024-05-15
Payer: MEDICARE

## 2024-05-15 DIAGNOSIS — F98.8 ATTENTION DEFICIT DISORDER OF ADULT: ICD-10-CM

## 2024-05-15 RX ORDER — DEXTROAMPHETAMINE SACCHARATE, AMPHETAMINE ASPARTATE, DEXTROAMPHETAMINE SULFATE AND AMPHETAMINE SULFATE 2.5; 2.5; 2.5; 2.5 MG/1; MG/1; MG/1; MG/1
10 TABLET ORAL 2 TIMES DAILY
Qty: 60 TABLET | Refills: 0 | OUTPATIENT
Start: 2024-05-15 | End: 2024-06-14

## 2024-05-15 RX ORDER — DEXTROAMPHETAMINE SACCHARATE, AMPHETAMINE ASPARTATE, DEXTROAMPHETAMINE SULFATE AND AMPHETAMINE SULFATE 2.5; 2.5; 2.5; 2.5 MG/1; MG/1; MG/1; MG/1
10 TABLET ORAL 2 TIMES DAILY
Qty: 60 TABLET | Refills: 0 | Status: SHIPPED | OUTPATIENT
Start: 2024-05-15 | End: 2024-06-14

## 2024-05-15 RX ORDER — DEXTROAMPHETAMINE SACCHARATE, AMPHETAMINE ASPARTATE, DEXTROAMPHETAMINE SULFATE AND AMPHETAMINE SULFATE 2.5; 2.5; 2.5; 2.5 MG/1; MG/1; MG/1; MG/1
10 TABLET ORAL 2 TIMES DAILY
Qty: 60 TABLET | Refills: 0 | Status: CANCELLED | OUTPATIENT
Start: 2024-05-15 | End: 2024-06-14

## 2024-06-03 ENCOUNTER — APPOINTMENT (OUTPATIENT)
Dept: OTOLARYNGOLOGY | Facility: CLINIC | Age: 69
End: 2024-06-03
Payer: MEDICARE

## 2024-06-17 NOTE — PROGRESS NOTES
Facial Plastic & Reconstructive Surgery    4/29/24  DYSPORT INJECTION    Xeomin trial failed per VV 4/30/24    Dysport injection  INDICATIONS AND CONSENT  Annamarie Tesfaye presents with facial synkinesis and dyskinesia. The patient was offered the above procedures and after the risks, benefits, alternatives, and limitations to the procedure were discussed, the patient elected to proceed.  Risks discussed included but were not limited to bruising, lack of effect, ptosis, or an undesirable outcome including contour irregularities.  Informed consent was obtained.     DESCRIPTION OF PROCEDURE:  Using sterile technique, the injections were performed as follows:     Dysport was injected into the bilateral orbicularis oculi, bilateral zygomaticus, bilateral orbicularis oris, bilateral corrugators, bilateral mentalis.  A total of 162 units was injected.    The precise locations and amounts were recorded on the facial map.  The patient tolerated the procedure well without any complications.         -----------------------------------------  Returns for VV 4/30/24  Unfortunately limited response to last injection with xeomin with results fading after 2-3 weeks. We discussed trial of dysport over xeomin in the future. She has already failed to achieve results with botox as well.    Will re-attempt in 4 weeks with dysport instead of xeomin.    This visit was conducted by means of a two-way synchronous audio-only connection, and technical limitations of providing care through this modality, including that audio-only telephone technology may not always be HIPAA-compliant, were explained to the patient. Patient has explicitly consented to this telephone visit. I communicated with the the patient on the phone during this visit, and more than 50% of the time was spent in counseling and coordination of care.    Total time spent on this visit: 15    Dx: Bilateral Blepharospasm, facial  spasms  ----------------------------------------------------   4/3/24 XEOMIN  Injection   INDICATIONS AND CONSENT  Annamarie Tesfaye presents with bilateral blepharospasm. The patient was offered the above procedures and after the risks, benefits, alternatives, and limitations to the procedure were discussed, the patient elected to proceed.  Risks discussed included but were not limited to bruising, lack of effect, ptosis, or an undesirable outcome including contour irregularities.  Informed consent was obtained.      DESCRIPTION OF PROCEDURE:  Using sterile technique, the injections were performed as follows:      Xeomin was injected into the bilateral orbicularis oris, bilateral corrugators, bilateral orbicularis oculi, bilateral procerus .  A total of 70 units was injected. 30 units were wasted.      The precise locations and amounts were recorded on the facial map.  The patient tolerated the procedure well without any complications.         -------------------------------------------------------     2/5/24: XEOMIN injection   Annamarie Tesfaye presents with benign blepharospasm. The patient was offered the above procedures and after the risks, benefits, alternatives, and limitations to the procedure were discussed, the patient elected to proceed.  Risks discussed included but were not limited to bruising, lack of effect, ptosis, or an undesirable outcome including contour irregularities.  Informed consent was obtained.      DESCRIPTION OF PROCEDURE:  Using sterile technique, the injections were performed as follows:      Xeomin was injected into the bl orbicularis oculi and corrugators..  A total of 56 units was injected. 44 units wasted     The precise locations and amounts were recorded on the facial map.  The patient tolerated the procedure well without any complications.       ----------------------------------------------------------------------     10/5/23: Xeomin was injected into the bl orbicularis oculi and  corrugators.  A total of 56 units was injected. 44 units was wasted.

## 2024-06-18 ENCOUNTER — TELEPHONE (OUTPATIENT)
Dept: BEHAVIORAL HEALTH | Facility: CLINIC | Age: 69
End: 2024-06-18
Payer: MEDICARE

## 2024-06-18 DIAGNOSIS — F98.8 ATTENTION DEFICIT DISORDER OF ADULT: ICD-10-CM

## 2024-06-18 RX ORDER — DEXTROAMPHETAMINE SACCHARATE, AMPHETAMINE ASPARTATE, DEXTROAMPHETAMINE SULFATE AND AMPHETAMINE SULFATE 2.5; 2.5; 2.5; 2.5 MG/1; MG/1; MG/1; MG/1
10 TABLET ORAL 2 TIMES DAILY
Qty: 60 TABLET | Refills: 0 | Status: SHIPPED | OUTPATIENT
Start: 2024-06-18 | End: 2024-07-18

## 2024-06-18 RX ORDER — DEXTROAMPHETAMINE SACCHARATE, AMPHETAMINE ASPARTATE, DEXTROAMPHETAMINE SULFATE AND AMPHETAMINE SULFATE 2.5; 2.5; 2.5; 2.5 MG/1; MG/1; MG/1; MG/1
10 TABLET ORAL 2 TIMES DAILY
Qty: 60 TABLET | Refills: 0 | Status: CANCELLED | OUTPATIENT
Start: 2024-06-18 | End: 2024-07-18

## 2024-06-19 ENCOUNTER — APPOINTMENT (OUTPATIENT)
Dept: OTOLARYNGOLOGY | Facility: CLINIC | Age: 69
End: 2024-06-19
Payer: MEDICARE

## 2024-06-19 VITALS — BODY MASS INDEX: 17.9 KG/M2 | WEIGHT: 111.4 LBS | HEIGHT: 66 IN

## 2024-06-19 DIAGNOSIS — G24.5 BENIGN ESSENTIAL BLEPHAROSPASM: ICD-10-CM

## 2024-06-19 DIAGNOSIS — Q67.0 FACIAL ASYMMETRIES: ICD-10-CM

## 2024-06-19 ASSESSMENT — PATIENT HEALTH QUESTIONNAIRE - PHQ9
SUM OF ALL RESPONSES TO PHQ9 QUESTIONS 1 AND 2: 0
2. FEELING DOWN, DEPRESSED OR HOPELESS: NOT AT ALL
1. LITTLE INTEREST OR PLEASURE IN DOING THINGS: NOT AT ALL

## 2024-06-20 RX ORDER — BOTULINUM TOXIN TYPE A 300 U/1
162 INJECTION, POWDER, LYOPHILIZED, FOR SOLUTION INTRAMUSCULAR ONCE
Status: SHIPPED | OUTPATIENT
Start: 2024-06-20

## 2024-07-16 ENCOUNTER — TELEMEDICINE (OUTPATIENT)
Dept: BEHAVIORAL HEALTH | Facility: CLINIC | Age: 69
End: 2024-07-16
Payer: MEDICARE

## 2024-07-16 DIAGNOSIS — F98.8 ATTENTION DEFICIT DISORDER OF ADULT: ICD-10-CM

## 2024-07-16 DIAGNOSIS — F41.8 DEPRESSION WITH ANXIETY: ICD-10-CM

## 2024-07-16 PROCEDURE — 1159F MED LIST DOCD IN RCRD: CPT | Performed by: PSYCHIATRY & NEUROLOGY

## 2024-07-16 PROCEDURE — 1160F RVW MEDS BY RX/DR IN RCRD: CPT | Performed by: PSYCHIATRY & NEUROLOGY

## 2024-07-16 PROCEDURE — 99214 OFFICE O/P EST MOD 30 MIN: CPT | Performed by: PSYCHIATRY & NEUROLOGY

## 2024-07-16 RX ORDER — DEXTROAMPHETAMINE SACCHARATE, AMPHETAMINE ASPARTATE, DEXTROAMPHETAMINE SULFATE AND AMPHETAMINE SULFATE 2.5; 2.5; 2.5; 2.5 MG/1; MG/1; MG/1; MG/1
10 TABLET ORAL 2 TIMES DAILY
Qty: 60 TABLET | Refills: 0 | Status: SHIPPED | OUTPATIENT
Start: 2024-07-16 | End: 2024-08-15

## 2024-07-16 RX ORDER — SERTRALINE HYDROCHLORIDE 100 MG/1
100 TABLET, FILM COATED ORAL DAILY
Qty: 90 TABLET | Refills: 1 | Status: SHIPPED | OUTPATIENT
Start: 2024-07-16 | End: 2025-01-12

## 2024-07-16 ASSESSMENT — ENCOUNTER SYMPTOMS: PSYCHIATRIC NEGATIVE: 1

## 2024-07-16 NOTE — PROGRESS NOTES
Subjective   Patient ID: Annamarie Tesfaye is a 68 y.o. female who presents for No chief complaint on file. I am doing better.     The patient engaged in a telehealth session via Epic audio visual or phone with this provider practicing within the [facility]. The identity of the patient was verified by their date of birth and last four digits of their social security number. The provider demonstrated that confidentially was preserved at their location. The patient was informed that they were responsible for ensuring confidentially was secured at their location. The patient's location was documented for emergency purposes. The patient was informed of the necessary steps that would occur if an emergency was to occur or technology failed during session.     The patient is alert, fully oriented, language is intact, and recent and remote memory intact. The patient denies any suicidal or homicidal ideation or plans. The patient presents with no depressive, manic or psychotic symptoms. Thought is logical and clear. No disturbances of judgment or insight are exhibited. No behavioral disturbances are present on examination.    The patient reports that he is doing better.       Review of Systems   Neurological:         The patient is alert, fully oriented, language is intact, and recent and remote memory intact. The patient denies any suicidal or homicidal ideation or plans. The patient presents with no depressive, manic or psychotic symptoms. Thought is logical and clear. No disturbances of judgment or insight are exhibited. No behavioral disturbances are present on examination.  The patient reports that he is doing better.      Psychiatric/Behavioral: Negative.          The patient is alert, fully oriented, language is intact, and recent and remote memory intact. The patient denies any suicidal or homicidal ideation or plans. The patient presents with no depressive, manic or psychotic symptoms. Thought is logical and clear.  No disturbances of judgment or insight are exhibited. No behavioral disturbances are present on examination.  The patient reports that he is doing better.      All other systems reviewed and are negative.      Objective   Physical Exam  Constitutional:       Appearance: Normal appearance.   Neurological:      General: No focal deficit present.      Mental Status: She is alert and oriented to person, place, and time. Mental status is at baseline.      Comments: The patient is alert, fully oriented, language is intact, and recent and remote memory intact. The patient denies any suicidal or homicidal ideation or plans. The patient presents with no depressive, manic or psychotic symptoms. Thought is logical and clear. No disturbances of judgment or insight are exhibited. No behavioral disturbances are present on examination.  The patient reports that he is doing better.      Psychiatric:         Mood and Affect: Mood normal.         Behavior: Behavior normal.         Thought Content: Thought content normal.         Judgment: Judgment normal.      Comments: The patient is alert, fully oriented, language is intact, and recent and remote memory intact. The patient denies any suicidal or homicidal ideation or plans. The patient presents with no depressive, manic or psychotic symptoms. Thought is logical and clear. No disturbances of judgment or insight are exhibited. No behavioral disturbances are present on examination.  The patient reports that he is doing better.            Lab Review:       Assessment/Plan   Psychiatric Risk Assessment  Violence Risk Assessment: none  Acute Risk of Harm to Others is Considered: low   Suicide Risk Assessment: age > 65 yrs old and   Protective Factors against Suicide: adherence to  treatment, fear of suicide, moral objections to suicide, positive family relationships, and sense of responsibility toward family  Acute Risk of Harm to Self is Considered: low    Diagnosis: anxiety  and depression and attention deficit disorder all in substantial remission.    Treatment plan:   The risks, benefits & alternatives to the medications prescribed were explained to you today. You were able to understand & repeat these risks, benefits & alternatives to this prescribed medication. You have agreed to proceed with treatment with the medications discussed based on the conclusion that the benefit outweighs the risks of this treatment regimen: increase Adderall generic to 10 mg morning and early afternoon daily and continue sertraline 100 mg daily with food. Follow up in person in three months at [facility] with plan to do your urine toxicology screen and your substance contract all of which are due in August of 2024.          Psych Review of Symptoms

## 2024-08-13 ENCOUNTER — TELEPHONE (OUTPATIENT)
Dept: BEHAVIORAL HEALTH | Facility: CLINIC | Age: 69
End: 2024-08-13

## 2024-08-13 ENCOUNTER — LAB (OUTPATIENT)
Dept: LAB | Facility: LAB | Age: 69
End: 2024-08-13
Payer: MEDICARE

## 2024-08-13 DIAGNOSIS — Z00.00 MEDICARE ANNUAL WELLNESS VISIT, SUBSEQUENT: ICD-10-CM

## 2024-08-13 DIAGNOSIS — N18.31 STAGE 3A CHRONIC KIDNEY DISEASE (MULTI): ICD-10-CM

## 2024-08-13 DIAGNOSIS — I10 PRIMARY HYPERTENSION: ICD-10-CM

## 2024-08-13 DIAGNOSIS — E55.9 VITAMIN D INSUFFICIENCY: ICD-10-CM

## 2024-08-13 DIAGNOSIS — F98.8 ATTENTION DEFICIT DISORDER OF ADULT: ICD-10-CM

## 2024-08-13 LAB
25(OH)D3 SERPL-MCNC: 37 NG/ML (ref 30–100)
ALBUMIN SERPL BCP-MCNC: 4.9 G/DL (ref 3.4–5)
ALP SERPL-CCNC: 50 U/L (ref 33–136)
ALT SERPL W P-5'-P-CCNC: 17 U/L (ref 7–45)
ANION GAP SERPL CALC-SCNC: 17 MMOL/L (ref 10–20)
AST SERPL W P-5'-P-CCNC: 18 U/L (ref 9–39)
BILIRUB SERPL-MCNC: 0.6 MG/DL (ref 0–1.2)
BUN SERPL-MCNC: 17 MG/DL (ref 6–23)
CALCIUM SERPL-MCNC: 10.7 MG/DL (ref 8.6–10.6)
CHLORIDE SERPL-SCNC: 95 MMOL/L (ref 98–107)
CHOLEST SERPL-MCNC: 171 MG/DL (ref 0–199)
CHOLESTEROL/HDL RATIO: 2.3
CO2 SERPL-SCNC: 26 MMOL/L (ref 21–32)
CREAT SERPL-MCNC: 1.07 MG/DL (ref 0.5–1.05)
CREAT UR-MCNC: 48.9 MG/DL (ref 20–320)
EGFRCR SERPLBLD CKD-EPI 2021: 57 ML/MIN/1.73M*2
ERYTHROCYTE [DISTWIDTH] IN BLOOD BY AUTOMATED COUNT: 11.7 % (ref 11.5–14.5)
EST. AVERAGE GLUCOSE BLD GHB EST-MCNC: 103 MG/DL
GLUCOSE SERPL-MCNC: 111 MG/DL (ref 74–99)
HBA1C MFR BLD: 5.2 %
HCT VFR BLD AUTO: 43.7 % (ref 36–46)
HDLC SERPL-MCNC: 76 MG/DL
HGB BLD-MCNC: 14.8 G/DL (ref 12–16)
LDLC SERPL CALC-MCNC: 86 MG/DL
MCH RBC QN AUTO: 30.9 PG (ref 26–34)
MCHC RBC AUTO-ENTMCNC: 33.9 G/DL (ref 32–36)
MCV RBC AUTO: 91 FL (ref 80–100)
MICROALBUMIN UR-MCNC: 8.8 MG/L
MICROALBUMIN/CREAT UR: 18 UG/MG CREAT
NON HDL CHOLESTEROL: 95 MG/DL (ref 0–149)
NRBC BLD-RTO: 0 /100 WBCS (ref 0–0)
PLATELET # BLD AUTO: 332 X10*3/UL (ref 150–450)
POTASSIUM SERPL-SCNC: 4.9 MMOL/L (ref 3.5–5.3)
PROT SERPL-MCNC: 7.7 G/DL (ref 6.4–8.2)
RBC # BLD AUTO: 4.79 X10*6/UL (ref 4–5.2)
SODIUM SERPL-SCNC: 133 MMOL/L (ref 136–145)
TRIGL SERPL-MCNC: 44 MG/DL (ref 0–149)
TSH SERPL-ACNC: 3.68 MIU/L (ref 0.44–3.98)
VLDL: 9 MG/DL (ref 0–40)
WBC # BLD AUTO: 9.5 X10*3/UL (ref 4.4–11.3)

## 2024-08-13 PROCEDURE — 80053 COMPREHEN METABOLIC PANEL: CPT

## 2024-08-13 PROCEDURE — 84443 ASSAY THYROID STIM HORMONE: CPT

## 2024-08-13 PROCEDURE — 36415 COLL VENOUS BLD VENIPUNCTURE: CPT

## 2024-08-13 PROCEDURE — 82306 VITAMIN D 25 HYDROXY: CPT

## 2024-08-13 PROCEDURE — 85027 COMPLETE CBC AUTOMATED: CPT

## 2024-08-13 PROCEDURE — 80061 LIPID PANEL: CPT

## 2024-08-13 PROCEDURE — 82570 ASSAY OF URINE CREATININE: CPT

## 2024-08-13 PROCEDURE — 83036 HEMOGLOBIN GLYCOSYLATED A1C: CPT

## 2024-08-13 PROCEDURE — 82043 UR ALBUMIN QUANTITATIVE: CPT

## 2024-08-13 RX ORDER — DEXTROAMPHETAMINE SACCHARATE, AMPHETAMINE ASPARTATE, DEXTROAMPHETAMINE SULFATE AND AMPHETAMINE SULFATE 2.5; 2.5; 2.5; 2.5 MG/1; MG/1; MG/1; MG/1
10 TABLET ORAL 2 TIMES DAILY
Qty: 60 TABLET | Refills: 0 | Status: CANCELLED | OUTPATIENT
Start: 2024-08-13 | End: 2024-09-12

## 2024-08-13 RX ORDER — DEXTROAMPHETAMINE SACCHARATE, AMPHETAMINE ASPARTATE, DEXTROAMPHETAMINE SULFATE AND AMPHETAMINE SULFATE 2.5; 2.5; 2.5; 2.5 MG/1; MG/1; MG/1; MG/1
10 TABLET ORAL 2 TIMES DAILY
Qty: 60 TABLET | Refills: 0 | Status: SHIPPED | OUTPATIENT
Start: 2024-08-14 | End: 2024-09-13

## 2024-08-20 ENCOUNTER — APPOINTMENT (OUTPATIENT)
Dept: PRIMARY CARE | Facility: CLINIC | Age: 69
End: 2024-08-20
Payer: MEDICARE

## 2024-08-21 ENCOUNTER — APPOINTMENT (OUTPATIENT)
Dept: OTOLARYNGOLOGY | Facility: CLINIC | Age: 69
End: 2024-08-21
Payer: MEDICARE

## 2024-08-21 VITALS
DIASTOLIC BLOOD PRESSURE: 78 MMHG | WEIGHT: 108 LBS | SYSTOLIC BLOOD PRESSURE: 140 MMHG | BODY MASS INDEX: 17.36 KG/M2 | HEIGHT: 66 IN | HEART RATE: 76 BPM

## 2024-08-21 DIAGNOSIS — G24.5 BLEPHAROSPASM: ICD-10-CM

## 2024-08-21 DIAGNOSIS — Q67.0 FACIAL ASYMMETRIES: ICD-10-CM

## 2024-08-21 DIAGNOSIS — G24.5 BENIGN ESSENTIAL BLEPHAROSPASM: ICD-10-CM

## 2024-08-21 PROCEDURE — 64612 DESTROY NERVE FACE MUSCLE: CPT | Performed by: PHYSICIAN ASSISTANT

## 2024-08-21 RX ORDER — BOTULINUM TOXIN TYPE A 300 U/1
300 INJECTION, POWDER, LYOPHILIZED, FOR SOLUTION INTRAMUSCULAR ONCE
Status: COMPLETED | OUTPATIENT
Start: 2024-08-21 | End: 2024-08-21

## 2024-08-21 NOTE — PROGRESS NOTES
Facial Plastic & Reconstructive Surgery      8/21/24 DYSPORT INJECTION    Dysport injection  INDICATIONS AND CONSENT  Annamarie Tesfaye presents with facial synkinesis and dyskinesia. The patient was offered the above procedures and after the risks, benefits, alternatives, and limitations to the procedure were discussed, the patient elected to proceed.  Risks discussed included but were not limited to bruising, lack of effect, ptosis, or an undesirable outcome including contour irregularities.  Informed consent was obtained.     DESCRIPTION OF PROCEDURE:  Using sterile technique, the injections were performed as follows:     Dysport was injected into the bilateral orbicularis oculi, bilateral zygomaticus, bilateral corrugators, bilateral mentalis.  A total of 158 units was injected.    The precise locations and amounts were recorded on the facial map.  The patient tolerated the procedure well without any complications.         Plan: Continue denervation with DYSPORT in 3 months.  Discussed possible future surgery for lower face to include potential jowel implants, midface lift, neck lift. Patient to think about this and get back to us.     Jennifer Shahid PA-C     ------------------------------------------------  6/19/24  DYSPORT INJECTION    Xeomin trial failed per VV 4/30/24    Dysport injection  INDICATIONS AND CONSENT  Annamarie Tesfaye presents with facial synkinesis and dyskinesia. The patient was offered the above procedures and after the risks, benefits, alternatives, and limitations to the procedure were discussed, the patient elected to proceed.  Risks discussed included but were not limited to bruising, lack of effect, ptosis, or an undesirable outcome including contour irregularities.  Informed consent was obtained.     DESCRIPTION OF PROCEDURE:  Using sterile technique, the injections were performed as follows:     Dysport was injected into the bilateral orbicularis oculi, bilateral zygomaticus, bilateral  orbicularis oris, bilateral corrugators, bilateral mentalis.  A total of 162 units was injected.    The precise locations and amounts were recorded on the facial map.  The patient tolerated the procedure well without any complications.         -----------------------------------------  Returns for VV 4/30/24  Unfortunately limited response to last injection with xeomin with results fading after 2-3 weeks. We discussed trial of dysport over xeomin in the future. She has already failed to achieve results with botox as well.    Will re-attempt in 4 weeks with dysport instead of xeomin.    This visit was conducted by means of a two-way synchronous audio-only connection, and technical limitations of providing care through this modality, including that audio-only telephone technology may not always be HIPAA-compliant, were explained to the patient. Patient has explicitly consented to this telephone visit. I communicated with the the patient on the phone during this visit, and more than 50% of the time was spent in counseling and coordination of care.    Total time spent on this visit: 15    Dx: Bilateral Blepharospasm, facial spasms  ----------------------------------------------------   4/3/24 XEOMIN  Injection   INDICATIONS AND CONSENT  Annamarie Tesfaye presents with bilateral blepharospasm. The patient was offered the above procedures and after the risks, benefits, alternatives, and limitations to the procedure were discussed, the patient elected to proceed.  Risks discussed included but were not limited to bruising, lack of effect, ptosis, or an undesirable outcome including contour irregularities.  Informed consent was obtained.      DESCRIPTION OF PROCEDURE:  Using sterile technique, the injections were performed as follows:      Xeomin was injected into the bilateral orbicularis oris, bilateral corrugators, bilateral orbicularis oculi, bilateral procerus .  A total of 70 units was injected. 30 units were wasted.       The precise locations and amounts were recorded on the facial map.  The patient tolerated the procedure well without any complications.         -------------------------------------------------------     2/5/24: XEOMIN injection   Annamarie Tesfaye presents with benign blepharospasm. The patient was offered the above procedures and after the risks, benefits, alternatives, and limitations to the procedure were discussed, the patient elected to proceed.  Risks discussed included but were not limited to bruising, lack of effect, ptosis, or an undesirable outcome including contour irregularities.  Informed consent was obtained.      DESCRIPTION OF PROCEDURE:  Using sterile technique, the injections were performed as follows:      Xeomin was injected into the bl orbicularis oculi and corrugators..  A total of 56 units was injected. 44 units wasted     The precise locations and amounts were recorded on the facial map.  The patient tolerated the procedure well without any complications.       ----------------------------------------------------------------------     10/5/23: Xeomin was injected into the bl orbicularis oculi and corrugators.  A total of 56 units was injected. 44 units was wasted.

## 2024-08-28 ENCOUNTER — APPOINTMENT (OUTPATIENT)
Dept: BEHAVIORAL HEALTH | Facility: CLINIC | Age: 69
End: 2024-08-28
Payer: MEDICARE

## 2024-09-16 ENCOUNTER — TELEPHONE (OUTPATIENT)
Dept: BEHAVIORAL HEALTH | Facility: CLINIC | Age: 69
End: 2024-09-16
Payer: MEDICARE

## 2024-09-16 DIAGNOSIS — F98.8 ATTENTION DEFICIT DISORDER OF ADULT: ICD-10-CM

## 2024-09-16 DIAGNOSIS — Z79.899 MEDICATION MANAGEMENT: ICD-10-CM

## 2024-09-16 RX ORDER — DEXTROAMPHETAMINE SACCHARATE, AMPHETAMINE ASPARTATE, DEXTROAMPHETAMINE SULFATE AND AMPHETAMINE SULFATE 2.5; 2.5; 2.5; 2.5 MG/1; MG/1; MG/1; MG/1
10 TABLET ORAL 2 TIMES DAILY
Qty: 60 TABLET | Refills: 0 | Status: CANCELLED | OUTPATIENT
Start: 2024-09-16 | End: 2024-10-16

## 2024-09-16 RX ORDER — DEXTROAMPHETAMINE SACCHARATE, AMPHETAMINE ASPARTATE, DEXTROAMPHETAMINE SULFATE AND AMPHETAMINE SULFATE 2.5; 2.5; 2.5; 2.5 MG/1; MG/1; MG/1; MG/1
10 TABLET ORAL 2 TIMES DAILY
Qty: 60 TABLET | Refills: 0 | Status: SHIPPED | OUTPATIENT
Start: 2024-09-16 | End: 2024-10-16

## 2024-09-17 DIAGNOSIS — I10 HYPERTENSION, UNSPECIFIED TYPE: ICD-10-CM

## 2024-09-22 RX ORDER — SPIRONOLACTONE 50 MG/1
50 TABLET, FILM COATED ORAL DAILY
Qty: 90 TABLET | Refills: 1 | Status: SHIPPED | OUTPATIENT
Start: 2024-09-22

## 2024-10-02 ENCOUNTER — APPOINTMENT (OUTPATIENT)
Dept: BEHAVIORAL HEALTH | Facility: CLINIC | Age: 69
End: 2024-10-02
Payer: MEDICARE

## 2024-10-02 ENCOUNTER — LAB (OUTPATIENT)
Dept: LAB | Facility: LAB | Age: 69
End: 2024-10-02
Payer: MEDICARE

## 2024-10-02 DIAGNOSIS — Z79.899 MEDICATION MANAGEMENT: ICD-10-CM

## 2024-10-03 ENCOUNTER — APPOINTMENT (OUTPATIENT)
Dept: BEHAVIORAL HEALTH | Facility: CLINIC | Age: 69
End: 2024-10-03
Payer: MEDICARE

## 2024-10-03 VITALS
RESPIRATION RATE: 18 BRPM | WEIGHT: 106.9 LBS | BODY MASS INDEX: 17.18 KG/M2 | DIASTOLIC BLOOD PRESSURE: 80 MMHG | SYSTOLIC BLOOD PRESSURE: 135 MMHG | TEMPERATURE: 98 F | HEIGHT: 66 IN | HEART RATE: 85 BPM

## 2024-10-03 DIAGNOSIS — F90.0 ATTENTION DEFICIT HYPERACTIVITY DISORDER (ADHD), PREDOMINANTLY INATTENTIVE TYPE: ICD-10-CM

## 2024-10-03 PROCEDURE — 3008F BODY MASS INDEX DOCD: CPT | Performed by: PSYCHIATRY & NEUROLOGY

## 2024-10-03 PROCEDURE — 1159F MED LIST DOCD IN RCRD: CPT | Performed by: PSYCHIATRY & NEUROLOGY

## 2024-10-03 PROCEDURE — 99214 OFFICE O/P EST MOD 30 MIN: CPT | Performed by: PSYCHIATRY & NEUROLOGY

## 2024-10-03 PROCEDURE — 3079F DIAST BP 80-89 MM HG: CPT | Performed by: PSYCHIATRY & NEUROLOGY

## 2024-10-03 PROCEDURE — 1160F RVW MEDS BY RX/DR IN RCRD: CPT | Performed by: PSYCHIATRY & NEUROLOGY

## 2024-10-03 PROCEDURE — 3075F SYST BP GE 130 - 139MM HG: CPT | Performed by: PSYCHIATRY & NEUROLOGY

## 2024-10-03 PROCEDURE — 1126F AMNT PAIN NOTED NONE PRSNT: CPT | Performed by: PSYCHIATRY & NEUROLOGY

## 2024-10-03 ASSESSMENT — PAIN SCALES - GENERAL: PAINLEVEL: 0-NO PAIN

## 2024-10-03 ASSESSMENT — ENCOUNTER SYMPTOMS: PSYCHIATRIC NEGATIVE: 1

## 2024-10-03 NOTE — PROGRESS NOTES
Subjective   Patient ID: Annamarie Tesfaye is a 68 y.o. female who presents for No chief complaint on file. I am doing better.     HPI: The patient reports doing well at the present time.     MSE: The patient is alert, fully oriented, language is intact, and recent and remote memory intact. The patient denies any suicidal or homicidal ideation or plans. The patient presents with no depressive, manic or psychotic symptoms. Thought is logical and clear. No disturbances of judgment or insight are exhibited. No behavioral disturbances are present on examination.    The patient reports that he is doing better.       Review of Systems   Neurological:         The patient is alert, fully oriented, language is intact, and recent and remote memory intact. The patient denies any suicidal or homicidal ideation or plans. The patient presents with no depressive, manic or psychotic symptoms. Thought is logical and clear. No disturbances of judgment or insight are exhibited. No behavioral disturbances are present on examination.  The patient reports that he is doing better.      Psychiatric/Behavioral: Negative.          The patient is alert, fully oriented, language is intact, and recent and remote memory intact. The patient denies any suicidal or homicidal ideation or plans. The patient presents with no depressive, manic or psychotic symptoms. Thought is logical and clear. No disturbances of judgment or insight are exhibited. No behavioral disturbances are present on examination.  The patient reports that he is doing better.      All other systems reviewed and are negative.      Objective   Physical Exam  Constitutional:       Appearance: Normal appearance.   Neurological:      General: No focal deficit present.      Mental Status: She is alert and oriented to person, place, and time. Mental status is at baseline.      Comments: The patient is alert, fully oriented, language is intact, and recent and remote memory intact. The patient  denies any suicidal or homicidal ideation or plans. The patient presents with no depressive, manic or psychotic symptoms. Thought is logical and clear. No disturbances of judgment or insight are exhibited. No behavioral disturbances are present on examination.  The patient reports that he is doing better.      Psychiatric:         Mood and Affect: Mood normal.         Behavior: Behavior normal.         Thought Content: Thought content normal.         Judgment: Judgment normal.      Comments: The patient is alert, fully oriented, language is intact, and recent and remote memory intact. The patient denies any suicidal or homicidal ideation or plans. The patient presents with no depressive, manic or psychotic symptoms. Thought is logical and clear. No disturbances of judgment or insight are exhibited. No behavioral disturbances are present on examination.  The patient reports that he is doing better.            Lab Review:   [facility]:10/3/24  No data recorded  I have personally reviewed the OAS report for Annamarie Tesfaye. I have considered the risks of abuse, dependence, addiction and diversion    Is the patient prescribed a combination of a benzodiazepine and opioid?  Yes, I feel it is clincially indicated to continue the medication and have discussed with the patient risks/benefits/alternatives.    Last Urine Drug Screen / ordered today: Yes  No results found for this or any previous visit (from the past 8760 hour(s)).  N/A The results are pending from 10/2/24.        Controlled Substance Agreement:  Date of the Last Agreement: 10/3/24  Reviewed Controlled Substance Agreement including but not limited to the benefits, risks, and alternatives to treatment with a Controlled Substance medication(s).    Stimulants:   What is the patient's goal of therapy? Improve attention.  Is this being achieved with current treatment? Yes    Activities of Daily Living:   Is your overall impression that this patient is benefiting (symptom  reduction outweighs side effects) from stimulant therapy? Yes     1. Physical Functioning: Better  2. Family Relationship: Better  3. Social Relationship: Better  4. Mood: Better  5. Sleep Patterns: Better  6. Overall Function: Better      Assessment/Plan   Psychiatric Risk Assessment  Violence Risk Assessment: none  Acute Risk of Harm to Others is Considered: low   Suicide Risk Assessment: age > 65 yrs old and   Protective Factors against Suicide: adherence to  treatment, fear of suicide, moral objections to suicide, positive family relationships, and sense of responsibility toward family  Acute Risk of Harm to Self is Considered: low    Diagnosis: anxiety and depression and attention deficit disorder all in substantial remission.    Treatment plan:   The risks, benefits & alternatives to the medications prescribed were explained to you today. You were able to understand & repeat these risks, benefits & alternatives to this prescribed medication. You have agreed to proceed with treatment with the medications discussed based on the conclusion that the benefit outweighs the risks of this treatment regimen: increase Adderall generic to 10 mg morning and early afternoon daily and continue sertraline 100 mg daily with food.     Follow up in January of 2025.       Psych Review of Symptoms:    ADHD: Patient denied any symptoms.         Anxiety: Patient denied any symptoms.         Developmental and Sensory Concerns: Patient denied any symptoms.         Depressive Symptoms: Patient denied any symptoms.         Disruptive and Conduct Symptoms: Patient denied any symptoms.         Eating / Feeding Concerns: Patient denied any symptoms.         Elimination Symptoms: Patient denied any symptoms.         Manic Symptoms: Patient denied any symptoms.         Obsessive-Compulsive Symptoms: Patient denied any symptoms.         Psychotic Symptoms: Patient denied any symptoms.           Trauma Related Symptoms: Patient  denied any symptoms.           Sleep Concerns: Patient denied any symptoms.

## 2024-10-04 DIAGNOSIS — E78.2 MIXED HYPERLIPIDEMIA: ICD-10-CM

## 2024-10-04 DIAGNOSIS — Z79.899 MEDICATION MANAGEMENT: ICD-10-CM

## 2024-10-05 LAB
AMPHET UR-MCNC: >5000 NG/ML
MDA UR-MCNC: <200 NG/ML
MDEA UR-MCNC: <200 NG/ML
MDMA UR-MCNC: <200 NG/ML
METHAMPHET UR-MCNC: <200 NG/ML
PHENTERMINE UR CFM-MCNC: <200 NG/ML

## 2024-10-07 DIAGNOSIS — F41.9 ANXIETY: ICD-10-CM

## 2024-10-07 DIAGNOSIS — F41.8 DEPRESSION WITH ANXIETY: ICD-10-CM

## 2024-10-07 DIAGNOSIS — F98.8 ATTENTION DEFICIT DISORDER OF ADULT: ICD-10-CM

## 2024-10-07 DIAGNOSIS — I10 HYPERTENSION, UNSPECIFIED TYPE: ICD-10-CM

## 2024-10-07 RX ORDER — LOVASTATIN 20 MG/1
20 TABLET ORAL NIGHTLY
Qty: 90 TABLET | Refills: 2 | Status: SHIPPED | OUTPATIENT
Start: 2024-10-07

## 2024-10-08 RX ORDER — AMLODIPINE BESYLATE 10 MG/1
10 TABLET ORAL DAILY
Qty: 90 TABLET | Refills: 0 | Status: SHIPPED | OUTPATIENT
Start: 2024-10-08

## 2024-10-09 ENCOUNTER — APPOINTMENT (OUTPATIENT)
Dept: PRIMARY CARE | Facility: CLINIC | Age: 69
End: 2024-10-09
Payer: MEDICARE

## 2024-10-09 VITALS
SYSTOLIC BLOOD PRESSURE: 140 MMHG | OXYGEN SATURATION: 96 % | DIASTOLIC BLOOD PRESSURE: 70 MMHG | HEART RATE: 75 BPM | BODY MASS INDEX: 16.94 KG/M2 | WEIGHT: 105.4 LBS | HEIGHT: 66 IN

## 2024-10-09 DIAGNOSIS — Z53.20 SCREENING MAMMOGRAPHY DECLINED: ICD-10-CM

## 2024-10-09 DIAGNOSIS — Z00.00 MEDICARE ANNUAL WELLNESS VISIT, SUBSEQUENT: ICD-10-CM

## 2024-10-09 DIAGNOSIS — I10 PRIMARY HYPERTENSION: Primary | ICD-10-CM

## 2024-10-09 DIAGNOSIS — N18.31 STAGE 3A CHRONIC KIDNEY DISEASE (MULTI): ICD-10-CM

## 2024-10-09 DIAGNOSIS — Z78.0 ASYMPTOMATIC MENOPAUSE: ICD-10-CM

## 2024-10-09 PROCEDURE — 3077F SYST BP >= 140 MM HG: CPT | Performed by: STUDENT IN AN ORGANIZED HEALTH CARE EDUCATION/TRAINING PROGRAM

## 2024-10-09 PROCEDURE — 1170F FXNL STATUS ASSESSED: CPT | Performed by: STUDENT IN AN ORGANIZED HEALTH CARE EDUCATION/TRAINING PROGRAM

## 2024-10-09 PROCEDURE — 1160F RVW MEDS BY RX/DR IN RCRD: CPT | Performed by: STUDENT IN AN ORGANIZED HEALTH CARE EDUCATION/TRAINING PROGRAM

## 2024-10-09 PROCEDURE — 99214 OFFICE O/P EST MOD 30 MIN: CPT | Performed by: STUDENT IN AN ORGANIZED HEALTH CARE EDUCATION/TRAINING PROGRAM

## 2024-10-09 PROCEDURE — 3008F BODY MASS INDEX DOCD: CPT | Performed by: STUDENT IN AN ORGANIZED HEALTH CARE EDUCATION/TRAINING PROGRAM

## 2024-10-09 PROCEDURE — 3078F DIAST BP <80 MM HG: CPT | Performed by: STUDENT IN AN ORGANIZED HEALTH CARE EDUCATION/TRAINING PROGRAM

## 2024-10-09 PROCEDURE — 1159F MED LIST DOCD IN RCRD: CPT | Performed by: STUDENT IN AN ORGANIZED HEALTH CARE EDUCATION/TRAINING PROGRAM

## 2024-10-09 PROCEDURE — 1036F TOBACCO NON-USER: CPT | Performed by: STUDENT IN AN ORGANIZED HEALTH CARE EDUCATION/TRAINING PROGRAM

## 2024-10-09 PROCEDURE — 1124F ACP DISCUSS-NO DSCNMKR DOCD: CPT | Performed by: STUDENT IN AN ORGANIZED HEALTH CARE EDUCATION/TRAINING PROGRAM

## 2024-10-09 PROCEDURE — G0439 PPPS, SUBSEQ VISIT: HCPCS | Performed by: STUDENT IN AN ORGANIZED HEALTH CARE EDUCATION/TRAINING PROGRAM

## 2024-10-09 ASSESSMENT — PATIENT HEALTH QUESTIONNAIRE - PHQ9
2. FEELING DOWN, DEPRESSED OR HOPELESS: SEVERAL DAYS
SUM OF ALL RESPONSES TO PHQ9 QUESTIONS 1 AND 2: 2
1. LITTLE INTEREST OR PLEASURE IN DOING THINGS: SEVERAL DAYS

## 2024-10-09 ASSESSMENT — ACTIVITIES OF DAILY LIVING (ADL)
TAKING_MEDICATION: INDEPENDENT
BATHING: INDEPENDENT
MANAGING_FINANCES: INDEPENDENT
DRESSING: INDEPENDENT
GROCERY_SHOPPING: INDEPENDENT
DOING_HOUSEWORK: INDEPENDENT

## 2024-10-09 NOTE — PROGRESS NOTES
Subjective   Reason for Visit: Annamarie Tesfaye is an 68 y.o. female here for a Medicare Wellness visit.     Past Medical, Surgical, and Family History reviewed and updated in chart.    Reviewed all medications by prescribing practitioner or clinical pharmacist (such as prescriptions, OTCs, herbal therapies and supplements) and documented in the medical record.    HPI      68-year-old female with hypertension, anxiety with depression, attention deficit, CKD stage3.         Patient Care Team:  Susy Santamaria MD as PCP - General (Family Medicine)  Susy Santamaria MD as PCP - [facility]ributed Provider     Current Outpatient Medications   Medication Instructions    amLODIPine (NORVASC) 10 mg, oral, Daily    amphetamine-dextroamphetamine (Adderall) 10 mg tablet 10 mg, oral, 2 times daily    CALCIUM ORAL oral    cyanocobalamin, vitamin B-12, (VITAMIN B-12 ORAL) oral    lovastatin (MEVACOR) 20 mg, oral, Nightly    neomycin-polymyxin-dexAMETHasone (Maxitrol) 3.5mg/mL-10,000 unit/mL-0.1 % ophthalmic suspension     sertraline (ZOLOFT) 100 mg, oral, Daily    spironolactone (ALDACTONE) 25 mg, oral, Daily, Along with the 50mg    spironolactone (ALDACTONE) 50 mg, oral, Daily        Social History     Tobacco Use    Smoking status: Never    Smokeless tobacco: Never   Substance Use Topics    Alcohol use: Not Currently        Review of Systems  Constitutional: no chills, no fever and no night sweats.     Eyes: no blurred vision and no eyesight problems.     ENT: no hearing loss, no nasal congestion, no nasal discharge, no hoarseness and no sore throat.     Cardiovascular: no chest pain, no intermittent leg claudication, no lower extremity edema, no palpitations and no syncope.     Respiratory: no cough, no shortness of breath during exertion, no shortness of breath at rest and no wheezing.     Gastrointestinal: no abdominal pain, no blood in stools, no constipation, no diarrhea, no melena, no nausea, no rectal pain  "and no vomiting.     Genitourinary: no dysuria, no change in urinary frequency, no urinary hesitancy, no feelings of urinary urgency and no vaginal discharge.     Musculoskeletal: no arthralgias, no back pain and no myalgias.     Integumentary: no new skin lesions and no rashes.     Neurological: no difficulty walking, no headache, no limb weakness, no numbness and no tingling.     Psychiatric: no anxiety, no depression, no anhedonia and no substance use disorders.     Endocrine: no recent weight gain and no recent weight loss.     Hematologic/Lymphatic: no tendency for easy bruising and no swollen glands.          All other systems have been reviewed and are negative for complaint.    Objective   Vitals:  /70   Pulse 75   Ht 1.676 m (5' 6\")   Wt 47.8 kg (105 lb 6.4 oz)   SpO2 96%   BMI 17.01 kg/m²       Physical Exam    Constitutional: Alert and in no acute distress. Well developed, well nourished.     Eyes: Normal external exam. Pupils were equal in size, round, reactive to light (PERRL) with normal accommodation and extraocular movements intact (EOMI).     Ears, Nose, Mouth, and Throat: External inspection of ears and nose: Normal.  Otoscopic examination: Normal.      Neck: No neck mass was observed. Supple.     Cardiovascular: Heart rate and rhythm were normal, normal S1 and S2, no gallops, no murmurs and no pericardial rub    Pulmonary: No respiratory distress. Clear bilateral breath sounds.     Abdomen: Soft nontender; no abdominal mass palpated. No organomegaly.     Musculoskeletal: No joint swelling seen, normal movements of all extremities. Range of motion: Normal.  Muscle strength/tone: Normal.      .    Neurologic: Deep tendon reflexes were 2+ and symmetric. Sensation: Normal.     Psychiatric: Judgment and insight: Intact. Mood and affect: Normal.  .  Assessment/Plan   Problem List Items Addressed This Visit       Stage 3a chronic kidney disease (Multi)    Relevant Orders    Renal function " panel    Parathyroid Hormone, Intact    Uric Acid    HTN (hypertension) - Primary    Asymptomatic menopause    Relevant Orders    XR DEXA bone density    Referral to Rheumatology     Other Visit Diagnoses       Medicare annual wellness visit, subsequent        Screening mammography declined                68year-old female with hypertension, anxiety with depression, attention deficit, CKD stage3.     #Hypertension: At goal continue spironolactone 50 mg and amlodipine 10 mg. RTO in 6m or sooner if needed   Will consider increase spironolactone to 100mg if bP are high at fu visit      # HPL : on statin   # Anxiety and depression : Stable on the current med   # CKD stage 3: labs as above, to be done in Feb 2025        Influenza: influenza vaccine was previously given.   Prevnar 20: Prevnar 20 vaccine  was given today   Shingles Vaccine: Shingles vaccine was previously given.   Breast cancer screening: Last done in 2020 per verbal report , pt would like to defer it to 5 years.   Cervical cancer screening: screening not indicated.   Osteoporosis screening: screening  ordered. Osteoporosis in 2022 dexa   Colorectal cancer screening: screening is current and 3.5 years ago at  , was advised to rpt in 10 years.  Done July 2021   HIV screening: screening not indicated.        Labs to be in Feb 2025 .   RTO in 6m     This note is intended for the physician writing it, as well as to communicate findings to other healthcare professionals. These notes use medical lexicon that may be misunderstood by non medical persons. Therefore, interpretations of medical notes and terminology should be approached with caution.

## 2024-10-10 RX ORDER — DEXTROAMPHETAMINE SACCHARATE, AMPHETAMINE ASPARTATE, DEXTROAMPHETAMINE SULFATE AND AMPHETAMINE SULFATE 2.5; 2.5; 2.5; 2.5 MG/1; MG/1; MG/1; MG/1
10 TABLET ORAL 2 TIMES DAILY
Qty: 60 TABLET | Refills: 0 | Status: SHIPPED | OUTPATIENT
Start: 2024-10-15 | End: 2024-11-14

## 2024-10-10 NOTE — PROGRESS NOTES
Facial Plastic & Reconstructive Surgery      10/16/24 DYSPORT INJECTION  Dx: Benign blepharospasm, facial synkinesia, dyskinesia    INDICATIONS AND CONSENT  Annamarie Tesfaye presents with facial synkinesis and dyskinesia. The patient was offered the above procedures and after the risks, benefits, alternatives, and limitations to the procedure were discussed, the patient elected to proceed.  Risks discussed included but were not limited to bruising, lack of effect, ptosis, or an undesirable outcome including contour irregularities.  Informed consent was obtained.     DESCRIPTION OF PROCEDURE:  Using sterile technique, the injections were performed as follows:     DYSPORT was injected into the bilateral orbicularis oculi, bilateral mentalis, bilateral corrugatrs.  A total of 154 units was injected by Dr. Davila.     The precise locations and amounts were recorded on the facial map.  The patient tolerated the procedure well without any complications.          -----------------------------------------------  8/21/24 DYSPORT INJECTION    Dysport injection  INDICATIONS AND CONSENT  Annamarie Tesfaye presents with facial synkinesis and dyskinesia. The patient was offered the above procedures and after the risks, benefits, alternatives, and limitations to the procedure were discussed, the patient elected to proceed.  Risks discussed included but were not limited to bruising, lack of effect, ptosis, or an undesirable outcome including contour irregularities.  Informed consent was obtained.     DESCRIPTION OF PROCEDURE:  Using sterile technique, the injections were performed as follows:     Dysport was injected into the bilateral orbicularis oculi, bilateral zygomaticus, bilateral corrugators, bilateral mentalis.  A total of 158 units was injected.    The precise locations and amounts were recorded on the facial map.  The patient tolerated the procedure well without any complications.         Plan: Continue denervation with  DYSPORT in 3 months.  Discussed possible future surgery for lower face to include potential jowel implants, midface lift, neck lift. Patient to think about this and get back to us.     Jennifer Shahid PA-C     ------------------------------------------------  6/19/24  DYSPORT INJECTION    Xeomin trial failed per VV 4/30/24    Dysport injection  INDICATIONS AND CONSENT  Annamarie Tesfaye presents with facial synkinesis and dyskinesia. The patient was offered the above procedures and after the risks, benefits, alternatives, and limitations to the procedure were discussed, the patient elected to proceed.  Risks discussed included but were not limited to bruising, lack of effect, ptosis, or an undesirable outcome including contour irregularities.  Informed consent was obtained.     DESCRIPTION OF PROCEDURE:  Using sterile technique, the injections were performed as follows:     Dysport was injected into the bilateral orbicularis oculi, bilateral zygomaticus, bilateral orbicularis oris, bilateral corrugators, bilateral mentalis.  A total of 162 units was injected.    The precise locations and amounts were recorded on the facial map.  The patient tolerated the procedure well without any complications.         -----------------------------------------  Returns for VV 4/30/24  Unfortunately limited response to last injection with xeomin with results fading after 2-3 weeks. We discussed trial of dysport over xeomin in the future. She has already failed to achieve results with botox as well.    Will re-attempt in 4 weeks with dysport instead of xeomin.    This visit was conducted by means of a two-way synchronous audio-only connection, and technical limitations of providing care through this modality, including that audio-only telephone technology may not always be HIPAA-compliant, were explained to the patient. Patient has explicitly consented to this telephone visit. I communicated with the the patient on the phone during this  visit, and more than 50% of the time was spent in counseling and coordination of care.    Total time spent on this visit: 15    Dx: Bilateral Blepharospasm, facial spasms  ----------------------------------------------------   4/3/24 XEOMIN  Injection   INDICATIONS AND CONSENT  Annamarie Tesfaye presents with bilateral blepharospasm. The patient was offered the above procedures and after the risks, benefits, alternatives, and limitations to the procedure were discussed, the patient elected to proceed.  Risks discussed included but were not limited to bruising, lack of effect, ptosis, or an undesirable outcome including contour irregularities.  Informed consent was obtained.      DESCRIPTION OF PROCEDURE:  Using sterile technique, the injections were performed as follows:      Xeomin was injected into the bilateral orbicularis oris, bilateral corrugators, bilateral orbicularis oculi, bilateral procerus .  A total of 70 units was injected. 30 units were wasted.      The precise locations and amounts were recorded on the facial map.  The patient tolerated the procedure well without any complications.         -------------------------------------------------------     2/5/24: XEOMIN injection   Annamarie Tesfaye presents with benign blepharospasm. The patient was offered the above procedures and after the risks, benefits, alternatives, and limitations to the procedure were discussed, the patient elected to proceed.  Risks discussed included but were not limited to bruising, lack of effect, ptosis, or an undesirable outcome including contour irregularities.  Informed consent was obtained.      DESCRIPTION OF PROCEDURE:  Using sterile technique, the injections were performed as follows:      Xeomin was injected into the bl orbicularis oculi and corrugators..  A total of 56 units was injected. 44 units wasted     The precise locations and amounts were recorded on the facial map.  The patient tolerated the procedure well without  any complications.       ----------------------------------------------------------------------     10/5/23: Xeomin was injected into the bl orbicularis oculi and corrugators.  A total of 56 units was injected. 44 units was wasted.

## 2024-10-16 ENCOUNTER — APPOINTMENT (OUTPATIENT)
Dept: OTOLARYNGOLOGY | Facility: CLINIC | Age: 69
End: 2024-10-16
Payer: MEDICARE

## 2024-10-16 VITALS — HEIGHT: 66 IN | WEIGHT: 108.4 LBS | BODY MASS INDEX: 17.42 KG/M2

## 2024-10-16 DIAGNOSIS — G24.5 BLEPHAROSPASM: Primary | ICD-10-CM

## 2024-10-16 DIAGNOSIS — Q67.0 FACIAL ASYMMETRIES: ICD-10-CM

## 2024-10-16 DIAGNOSIS — G24.5 BENIGN ESSENTIAL BLEPHAROSPASM: ICD-10-CM

## 2024-10-16 PROCEDURE — 1159F MED LIST DOCD IN RCRD: CPT | Performed by: OTOLARYNGOLOGY

## 2024-10-16 PROCEDURE — 64612 DESTROY NERVE FACE MUSCLE: CPT | Performed by: OTOLARYNGOLOGY

## 2024-10-16 PROCEDURE — 3008F BODY MASS INDEX DOCD: CPT | Performed by: OTOLARYNGOLOGY

## 2024-10-16 RX ORDER — BOTULINUM TOXIN TYPE A 300 U/1
154 INJECTION, POWDER, LYOPHILIZED, FOR SOLUTION INTRAMUSCULAR ONCE
Status: COMPLETED | OUTPATIENT
Start: 2024-10-16 | End: 2024-10-16

## 2024-10-21 ENCOUNTER — APPOINTMENT (OUTPATIENT)
Dept: OTOLARYNGOLOGY | Facility: CLINIC | Age: 69
End: 2024-10-21
Payer: MEDICARE

## 2024-11-04 ENCOUNTER — APPOINTMENT (OUTPATIENT)
Dept: RHEUMATOLOGY | Facility: CLINIC | Age: 69
End: 2024-11-04
Payer: MEDICARE

## 2024-11-11 DIAGNOSIS — F98.8 ATTENTION DEFICIT DISORDER OF ADULT: ICD-10-CM

## 2024-11-11 RX ORDER — DEXTROAMPHETAMINE SACCHARATE, AMPHETAMINE ASPARTATE, DEXTROAMPHETAMINE SULFATE AND AMPHETAMINE SULFATE 2.5; 2.5; 2.5; 2.5 MG/1; MG/1; MG/1; MG/1
10 TABLET ORAL 2 TIMES DAILY
Qty: 60 TABLET | Refills: 0 | Status: SHIPPED | OUTPATIENT
Start: 2024-11-14 | End: 2024-12-14

## 2024-11-12 DIAGNOSIS — I10 PRIMARY HYPERTENSION: ICD-10-CM

## 2024-11-12 DIAGNOSIS — I10 HYPERTENSION, UNSPECIFIED TYPE: ICD-10-CM

## 2024-11-13 RX ORDER — SPIRONOLACTONE 25 MG/1
25 TABLET ORAL DAILY
Qty: 90 TABLET | Refills: 1 | Status: SHIPPED | OUTPATIENT
Start: 2024-11-13

## 2024-11-13 RX ORDER — AMLODIPINE BESYLATE 10 MG/1
10 TABLET ORAL DAILY
Qty: 90 TABLET | Refills: 0 | Status: SHIPPED | OUTPATIENT
Start: 2024-11-13

## 2024-11-27 ENCOUNTER — APPOINTMENT (OUTPATIENT)
Dept: BEHAVIORAL HEALTH | Facility: CLINIC | Age: 69
End: 2024-11-27
Payer: MEDICARE

## 2024-12-04 ENCOUNTER — HOSPITAL ENCOUNTER (OUTPATIENT)
Dept: RADIOLOGY | Facility: CLINIC | Age: 69
Discharge: HOME | End: 2024-12-04
Payer: MEDICARE

## 2024-12-04 DIAGNOSIS — Z78.0 ASYMPTOMATIC MENOPAUSE: ICD-10-CM

## 2024-12-04 PROCEDURE — 77080 DXA BONE DENSITY AXIAL: CPT

## 2024-12-04 PROCEDURE — 77080 DXA BONE DENSITY AXIAL: CPT | Performed by: RADIOLOGY

## 2024-12-10 DIAGNOSIS — M85.80 OSTEOPENIA, UNSPECIFIED LOCATION: ICD-10-CM

## 2024-12-16 DIAGNOSIS — F98.8 ATTENTION DEFICIT DISORDER OF ADULT: ICD-10-CM

## 2024-12-16 RX ORDER — DEXTROAMPHETAMINE SACCHARATE, AMPHETAMINE ASPARTATE, DEXTROAMPHETAMINE SULFATE AND AMPHETAMINE SULFATE 2.5; 2.5; 2.5; 2.5 MG/1; MG/1; MG/1; MG/1
10 TABLET ORAL 2 TIMES DAILY
Qty: 60 TABLET | Refills: 0 | Status: SHIPPED | OUTPATIENT
Start: 2024-12-16 | End: 2025-01-15

## 2024-12-17 NOTE — PROGRESS NOTES
Facial Plastic & Reconstructive Surgery      12/18/24 DYSPORT INJECTION  Last DSYPORT INJECTION 10/16/24   Dx: Benign blepharospasm, facial synkinesia, dyskinesia    INDICATIONS AND CONSENT  Annamarie Tesfaye presents with facial synkinesis and dyskinesia. The patient was offered the above procedures and after the risks, benefits, alternatives, and limitations to the procedure were discussed, the patient elected to proceed.  Risks discussed included but were not limited to bruising, lack of effect, ptosis, or an undesirable outcome including contour irregularities.  Informed consent was obtained.     DESCRIPTION OF PROCEDURE:  Using sterile technique, the injections were performed as follows:     DYSPORT was injected into the bilateral orbicularis oculi, bilateral mentalis, bilateral corrugatrs.  A total of 154 units was injected by Dr. Davila.     The precise locations and amounts were recorded on the facial map.  The patient tolerated the procedure well without any complications.       -------------------------------------------------------------------  10/16/24 DYSPORT INJECTION  Dx: Benign blepharospasm, facial synkinesia, dyskinesia    INDICATIONS AND CONSENT  Annamarie Tesfaye presents with facial synkinesis and dyskinesia. The patient was offered the above procedures and after the risks, benefits, alternatives, and limitations to the procedure were discussed, the patient elected to proceed.  Risks discussed included but were not limited to bruising, lack of effect, ptosis, or an undesirable outcome including contour irregularities.  Informed consent was obtained.     DESCRIPTION OF PROCEDURE:  Using sterile technique, the injections were performed as follows:     DYSPORT was injected into the bilateral orbicularis oculi, bilateral mentalis, bilateral corrugatrs.  A total of 154 units was injected by Dr. Davila.     The precise locations and amounts were recorded on the facial map.  The patient tolerated the  procedure well without any complications.          -----------------------------------------------  8/21/24 DYSPORT INJECTION    Dysport injection  INDICATIONS AND CONSENT  Annamarie Tesfaye presents with facial synkinesis and dyskinesia. The patient was offered the above procedures and after the risks, benefits, alternatives, and limitations to the procedure were discussed, the patient elected to proceed.  Risks discussed included but were not limited to bruising, lack of effect, ptosis, or an undesirable outcome including contour irregularities.  Informed consent was obtained.     DESCRIPTION OF PROCEDURE:  Using sterile technique, the injections were performed as follows:     Dysport was injected into the bilateral orbicularis oculi, bilateral zygomaticus, bilateral corrugators, bilateral mentalis.  A total of 158 units was injected.    The precise locations and amounts were recorded on the facial map.  The patient tolerated the procedure well without any complications.         Plan: Continue denervation with DYSPORT in 3 months.  Discussed possible future surgery for lower face to include potential jowel implants, midface lift, neck lift. Patient to think about this and get back to us.     Jennifer Shahid PA-C     ------------------------------------------------  6/19/24  DYSPORT INJECTION    Xeomin trial failed per VV 4/30/24    Dysport injection  INDICATIONS AND CONSENT  Annamarie Tesfaye presents with facial synkinesis and dyskinesia. The patient was offered the above procedures and after the risks, benefits, alternatives, and limitations to the procedure were discussed, the patient elected to proceed.  Risks discussed included but were not limited to bruising, lack of effect, ptosis, or an undesirable outcome including contour irregularities.  Informed consent was obtained.     DESCRIPTION OF PROCEDURE:  Using sterile technique, the injections were performed as follows:     Dysport was injected into the bilateral  orbicularis oculi, bilateral zygomaticus, bilateral orbicularis oris, bilateral corrugators, bilateral mentalis.  A total of 162 units was injected.    The precise locations and amounts were recorded on the facial map.  The patient tolerated the procedure well without any complications.         -----------------------------------------  Returns for [facility]V 4/30/24  Unfortunately limited response to last injection with xeomin with results fading after 2-3 weeks. We discussed trial of dysport over xeomin in the future. She has already failed to achieve results with botox as well.    Will re-attempt in 4 weeks with dysport instead of xeomin.    This visit was conducted by means of a two-way synchronous audio-only connection, and technical limitations of providing care through this modality, including that audio-only telephone technology may not always be HIPAA-compliant, were explained to the patient. Patient has explicitly consented to this telephone visit. I communicated with the the patient on the phone during this visit, and more than 50% of the time was spent in counseling and coordination of care.    Total time spent on this visit: 15    Dx: Bilateral Blepharospasm, facial spasms  ----------------------------------------------------   4/3/24 XEOMIN  Injection   INDICATIONS AND CONSENT  Annamarie Tesfaye presents with bilateral blepharospasm. The patient was offered the above procedures and after the risks, benefits, alternatives, and limitations to the procedure were discussed, the patient elected to proceed.  Risks discussed included but were not limited to bruising, lack of effect, ptosis, or an undesirable outcome including contour irregularities.  Informed consent was obtained.      DESCRIPTION OF PROCEDURE:  Using sterile technique, the injections were performed as follows:      Xeomin was injected into the bilateral orbicularis oris, bilateral corrugators, bilateral orbicularis oculi, bilateral procerus .  A total  of 70 units was injected. 30 units were wasted.      The precise locations and amounts were recorded on the facial map.  The patient tolerated the procedure well without any complications.         -------------------------------------------------------     2/5/24: XEOMIN injection   Annamarie Tesfaye presents with benign blepharospasm. The patient was offered the above procedures and after the risks, benefits, alternatives, and limitations to the procedure were discussed, the patient elected to proceed.  Risks discussed included but were not limited to bruising, lack of effect, ptosis, or an undesirable outcome including contour irregularities.  Informed consent was obtained.      DESCRIPTION OF PROCEDURE:  Using sterile technique, the injections were performed as follows:      Xeomin was injected into the bl orbicularis oculi and corrugators..  A total of 56 units was injected. 44 units wasted     The precise locations and amounts were recorded on the facial map.  The patient tolerated the procedure well without any complications.       ----------------------------------------------------------------------     10/5/23: Xeomin was injected into the bl orbicularis oculi and corrugators.  A total of 56 units was injected. 44 units was wasted.

## 2024-12-18 ENCOUNTER — APPOINTMENT (OUTPATIENT)
Dept: OTOLARYNGOLOGY | Facility: CLINIC | Age: 69
End: 2024-12-18
Payer: MEDICARE

## 2024-12-18 VITALS — BODY MASS INDEX: 17.84 KG/M2 | HEIGHT: 66 IN | WEIGHT: 111 LBS

## 2024-12-18 DIAGNOSIS — G24.5 BLEPHAROSPASM: ICD-10-CM

## 2024-12-18 DIAGNOSIS — Q67.0 FACIAL ASYMMETRIES: ICD-10-CM

## 2024-12-18 DIAGNOSIS — G24.5 BENIGN ESSENTIAL BLEPHAROSPASM: ICD-10-CM

## 2024-12-18 PROCEDURE — 1159F MED LIST DOCD IN RCRD: CPT | Performed by: OTOLARYNGOLOGY

## 2024-12-18 PROCEDURE — 64612 DESTROY NERVE FACE MUSCLE: CPT | Performed by: OTOLARYNGOLOGY

## 2024-12-18 PROCEDURE — 3008F BODY MASS INDEX DOCD: CPT | Performed by: OTOLARYNGOLOGY

## 2024-12-19 RX ORDER — BOTULINUM TOXIN TYPE A 300 U/1
154 INJECTION, POWDER, LYOPHILIZED, FOR SOLUTION INTRAMUSCULAR ONCE
Status: COMPLETED | OUTPATIENT
Start: 2024-12-19 | End: 2024-12-18

## 2025-01-10 DIAGNOSIS — F98.8 ATTENTION DEFICIT DISORDER OF ADULT: ICD-10-CM

## 2025-01-10 RX ORDER — DEXTROAMPHETAMINE SACCHARATE, AMPHETAMINE ASPARTATE, DEXTROAMPHETAMINE SULFATE AND AMPHETAMINE SULFATE 2.5; 2.5; 2.5; 2.5 MG/1; MG/1; MG/1; MG/1
10 TABLET ORAL 2 TIMES DAILY
Qty: 60 TABLET | Refills: 0 | Status: SHIPPED | OUTPATIENT
Start: 2025-01-15 | End: 2025-02-14

## 2025-01-11 NOTE — PROGRESS NOTES
Subjective   69 y.o. female who I am asked to see in consultation for New Patient Visit (New pt ref by Dr. Santamaria. Osteoporosis ).    HPI  Consult for Osteoporosis   Told years ago Osteopenia     Works in sales     Osteoporosis Risk Factors     Personal Hx of fracture: no  Hx of fracture in Mother: + hip FX at 90 years falling off wheelchair   Height loss:  no  Tobacco use: no   Alcoholism: n0  Recurrent falls: no  Inadequate physical activity: Walks light weighs   Calcium: none  Vit D: 2000 u daily    Hx of GERD or other esophageal disease:    Plans for invasive dental procedures:      ROS    /83   Pulse 81   Wt 49.4 kg (109 lb)   SpO2 99%   BMI 17.59 kg/m²     PHYSICAL EXAM    Imaging     Bone Density:    FINDINGS:  SPINE L1-L4  Bone Mineral Density: 0.785  T-Score -2.4  Z-Score -0.3  Bone Mineral Density change vs baseline:  -5.3  Bone Mineral Density change vs previous: -5.3      LEFT FEMUR -TOTAL  Bone Mineral Density: 0.625  T-Score -2.6   Z-Score  -1.2  Bone Mineral Density change vs baseline: 0.5  Bone Mineral Density change vs previous: 0.5      LEFT FEMUR -NECK  Bone Mineral Density: 0.604  T-Score -2.2  Z-Score -0.5    FRAX SCORE     Current Outpatient Medications   Medication Sig Dispense Refill    amLODIPine (Norvasc) 10 mg tablet Take 1 tablet (10 mg) by mouth once daily. 90 tablet 0    [START ON 1/15/2025] amphetamine-dextroamphetamine (Adderall) 10 mg tablet Take 1 tablet (10 mg) by mouth 2 times a day. Do not fill before January 15, 2025. 60 tablet 0    CALCIUM ORAL Take by mouth.      cyanocobalamin, vitamin B-12, (VITAMIN B-12 ORAL) Take by mouth.      lovastatin (Mevacor) 20 mg tablet Take 1 tablet (20 mg) by mouth once daily at bedtime. 90 tablet 2    neomycin-polymyxin-dexAMETHasone (Maxitrol) 3.5mg/mL-10,000 unit/mL-0.1 % ophthalmic suspension       sertraline (Zoloft) 100 mg tablet Take 1 tablet (100 mg) by mouth once daily. 90 tablet 1    spironolactone (Aldactone) 25 mg  "tablet Take 1 tablet (25 mg) by mouth once daily. Along with the 50mg 90 tablet 1    spironolactone (Aldactone) 50 mg tablet Take 1 tablet (50 mg) by mouth once daily. 90 tablet 1     No current facility-administered medications for this visit.         Lab Review  Lab Results   Component Value Date    CALCIUM 10.7 (H) 2024     Lab Results   Component Value Date    CREATININE 1.07 (H) 2024     No results found for: \"EGFRMUTATION\"  No results found for: \"PTH\"  Lab Results   Component Value Date    VITD25 37 2024     Lab Results   Component Value Date    TSH 3.68 2024     No components found for: \"CMP\"     Assessment/Plan   Patient has transformed from osteopenia to osteoporosis with a T-score of -2.6 at the femoral neck.    Her major risk factor is that her mother fractured her hip at age 90 after falling off a wheelchair and the mother  1 year later.    She does not take calcium but she has frequent elevated calcium levels    She takes some vitamin D approximately 2000 units daily but the vitamin D level is fairly low at 37.    Plan:    At this time I will order serum calcium PTH CTX and vitamin D    Most likely she will need increased vitamin D to 5000 units daily    Discussed bisphosphonate medications with her including alendronate and Reclast and will decide on the disease modifying drugs when lab returns.    We will regroup after that data is complete.  "

## 2025-01-13 ENCOUNTER — APPOINTMENT (OUTPATIENT)
Dept: RHEUMATOLOGY | Facility: CLINIC | Age: 70
End: 2025-01-13
Payer: MEDICARE

## 2025-01-13 VITALS
OXYGEN SATURATION: 99 % | DIASTOLIC BLOOD PRESSURE: 83 MMHG | SYSTOLIC BLOOD PRESSURE: 147 MMHG | WEIGHT: 109 LBS | HEART RATE: 81 BPM | BODY MASS INDEX: 17.59 KG/M2

## 2025-01-13 DIAGNOSIS — M81.0 OSTEOPOROSIS WITHOUT CURRENT PATHOLOGICAL FRACTURE, UNSPECIFIED OSTEOPOROSIS TYPE: Primary | ICD-10-CM

## 2025-01-13 DIAGNOSIS — M85.80 OSTEOPENIA, UNSPECIFIED LOCATION: ICD-10-CM

## 2025-01-13 PROCEDURE — 1159F MED LIST DOCD IN RCRD: CPT | Performed by: INTERNAL MEDICINE

## 2025-01-13 PROCEDURE — 99204 OFFICE O/P NEW MOD 45 MIN: CPT | Performed by: INTERNAL MEDICINE

## 2025-01-13 PROCEDURE — 1126F AMNT PAIN NOTED NONE PRSNT: CPT | Performed by: INTERNAL MEDICINE

## 2025-01-13 PROCEDURE — 3077F SYST BP >= 140 MM HG: CPT | Performed by: INTERNAL MEDICINE

## 2025-01-13 PROCEDURE — 3079F DIAST BP 80-89 MM HG: CPT | Performed by: INTERNAL MEDICINE

## 2025-01-13 ASSESSMENT — PAIN SCALES - GENERAL: PAINLEVEL_OUTOF10: 0-NO PAIN

## 2025-02-03 NOTE — PROGRESS NOTES
Facial Plastic & Reconstructive Surgery        2/5/25 DYSPORT INJECTION  Last DSYPORT INJECTION 12/18/24   Dx: Benign blepharospasm, facial synkinesia, dyskinesia    INDICATIONS AND CONSENT  Annamarie Tesfaye presents with facial synkinesis and dyskinesia. The patient was offered the above procedures and after the risks, benefits, alternatives, and limitations to the procedure were discussed, the patient elected to proceed.  Risks discussed included but were not limited to bruising, lack of effect, ptosis, or an undesirable outcome including contour irregularities.  Informed consent was obtained.     DESCRIPTION OF PROCEDURE:  Using sterile technique, the injections were performed as follows:     DYSPORT was injected into the bilateral orbicularis oculi, bilateral mentalis, bilateral corrugatrs.  A total of 154 units was injected by Dr. Davila.     The precise locations and amounts were recorded on the facial map.  The patient tolerated the procedure well without any complications.    Plan: Continue chemodenervation q 2 months         -----------------------------------  12/18/24 DYSPORT INJECTION  Last DSYPORT INJECTION 10/16/24   Dx: Benign blepharospasm, facial synkinesia, dyskinesia    INDICATIONS AND CONSENT  Annamarie Tesfaye presents with facial synkinesis and dyskinesia. The patient was offered the above procedures and after the risks, benefits, alternatives, and limitations to the procedure were discussed, the patient elected to proceed.  Risks discussed included but were not limited to bruising, lack of effect, ptosis, or an undesirable outcome including contour irregularities.  Informed consent was obtained.     DESCRIPTION OF PROCEDURE:  Using sterile technique, the injections were performed as follows:     DYSPORT was injected into the bilateral orbicularis oculi, bilateral mentalis, bilateral corrugatrs.  A total of 154 units was injected by Dr. Davila.     The precise locations and amounts were recorded  on the facial map.  The patient tolerated the procedure well without any complications.       -------------------------------------------------------------------  10/16/24 DYSPORT INJECTION  Dx: Benign blepharospasm, facial synkinesia, dyskinesia    INDICATIONS AND CONSENT  Annamarie Tesfaye presents with facial synkinesis and dyskinesia. The patient was offered the above procedures and after the risks, benefits, alternatives, and limitations to the procedure were discussed, the patient elected to proceed.  Risks discussed included but were not limited to bruising, lack of effect, ptosis, or an undesirable outcome including contour irregularities.  Informed consent was obtained.     DESCRIPTION OF PROCEDURE:  Using sterile technique, the injections were performed as follows:     DYSPORT was injected into the bilateral orbicularis oculi, bilateral mentalis, bilateral corrugatrs.  A total of 154 units was injected by Dr. Davila.     The precise locations and amounts were recorded on the facial map.  The patient tolerated the procedure well without any complications.          -----------------------------------------------  8/21/24 DYSPORT INJECTION    Dysport injection  INDICATIONS AND CONSENT  Annamarie Tesfaye presents with facial synkinesis and dyskinesia. The patient was offered the above procedures and after the risks, benefits, alternatives, and limitations to the procedure were discussed, the patient elected to proceed.  Risks discussed included but were not limited to bruising, lack of effect, ptosis, or an undesirable outcome including contour irregularities.  Informed consent was obtained.     DESCRIPTION OF PROCEDURE:  Using sterile technique, the injections were performed as follows:     Dysport was injected into the bilateral orbicularis oculi, bilateral zygomaticus, bilateral corrugators, bilateral mentalis.  A total of 158 units was injected.    The precise locations and amounts were recorded on the facial  map.  The patient tolerated the procedure well without any complications.         Plan: Continue denervation with DYSPORT in 3 months.  Discussed possible future surgery for lower face to include potential jowel implants, midface lift, neck lift. Patient to think about this and get back to us.     Jennifer Shahid PA-C     ------------------------------------------------  6/19/24  DYSPORT INJECTION    Xeomin trial failed per VV 4/30/24    Dysport injection  INDICATIONS AND CONSENT  Annamarie Tesfaye presents with facial synkinesis and dyskinesia. The patient was offered the above procedures and after the risks, benefits, alternatives, and limitations to the procedure were discussed, the patient elected to proceed.  Risks discussed included but were not limited to bruising, lack of effect, ptosis, or an undesirable outcome including contour irregularities.  Informed consent was obtained.     DESCRIPTION OF PROCEDURE:  Using sterile technique, the injections were performed as follows:     Dysport was injected into the bilateral orbicularis oculi, bilateral zygomaticus, bilateral orbicularis oris, bilateral corrugators, bilateral mentalis.  A total of 162 units was injected.    The precise locations and amounts were recorded on the facial map.  The patient tolerated the procedure well without any complications.         -----------------------------------------  Returns for [facility]V 4/30/24  Unfortunately limited response to last injection with xeomin with results fading after 2-3 weeks. We discussed trial of dysport over xeomin in the future. She has already failed to achieve results with botox as well.    Will re-attempt in 4 weeks with dysport instead of xeomin.    This visit was conducted by means of a two-way synchronous audio-only connection, and technical limitations of providing care through this modality, including that audio-only telephone technology may not always be HIPAA-compliant, were explained to the patient.  Patient has explicitly consented to this telephone visit. I communicated with the the patient on the phone during this visit, and more than 50% of the time was spent in counseling and coordination of care.    Total time spent on this visit: 15    Dx: Bilateral Blepharospasm, facial spasms  ----------------------------------------------------   4/3/24 XEOMIN  Injection   INDICATIONS AND CONSENT  Annamarie Tesfaye presents with bilateral blepharospasm. The patient was offered the above procedures and after the risks, benefits, alternatives, and limitations to the procedure were discussed, the patient elected to proceed.  Risks discussed included but were not limited to bruising, lack of effect, ptosis, or an undesirable outcome including contour irregularities.  Informed consent was obtained.      DESCRIPTION OF PROCEDURE:  Using sterile technique, the injections were performed as follows:      Xeomin was injected into the bilateral orbicularis oris, bilateral corrugators, bilateral orbicularis oculi, bilateral procerus .  A total of 70 units was injected. 30 units were wasted.      The precise locations and amounts were recorded on the facial map.  The patient tolerated the procedure well without any complications.         -------------------------------------------------------     2/5/24: XEOMIN injection   Annamarie Tesfaye presents with benign blepharospasm. The patient was offered the above procedures and after the risks, benefits, alternatives, and limitations to the procedure were discussed, the patient elected to proceed.  Risks discussed included but were not limited to bruising, lack of effect, ptosis, or an undesirable outcome including contour irregularities.  Informed consent was obtained.      DESCRIPTION OF PROCEDURE:  Using sterile technique, the injections were performed as follows:      Xeomin was injected into the bl orbicularis oculi and corrugators..  A total of 56 units was injected. 44 units wasted      The precise locations and amounts were recorded on the facial map.  The patient tolerated the procedure well without any complications.       ----------------------------------------------------------------------     10/5/23: Xeomin was injected into the bl orbicularis oculi and corrugators.  A total of 56 units was injected. 44 units was wasted.

## 2025-02-05 ENCOUNTER — APPOINTMENT (OUTPATIENT)
Dept: OTOLARYNGOLOGY | Facility: CLINIC | Age: 70
End: 2025-02-05
Payer: MEDICARE

## 2025-02-05 VITALS — WEIGHT: 111.8 LBS | BODY MASS INDEX: 17.97 KG/M2 | HEIGHT: 66 IN

## 2025-02-05 DIAGNOSIS — G24.5 BENIGN ESSENTIAL BLEPHAROSPASM: ICD-10-CM

## 2025-02-05 DIAGNOSIS — G24.5 BLEPHAROSPASM: ICD-10-CM

## 2025-02-05 DIAGNOSIS — Q67.0 FACIAL ASYMMETRIES: ICD-10-CM

## 2025-02-05 PROCEDURE — 64612 DESTROY NERVE FACE MUSCLE: CPT | Performed by: OTOLARYNGOLOGY

## 2025-02-05 RX ORDER — BOTULINUM TOXIN TYPE A 300 U/1
154 INJECTION, POWDER, LYOPHILIZED, FOR SOLUTION INTRAMUSCULAR ONCE
Status: COMPLETED | OUTPATIENT
Start: 2025-02-05 | End: 2025-02-05

## 2025-02-05 RX ADMIN — BOTULINUM TOXIN TYPE A 154 UNITS: 300 INJECTION, POWDER, LYOPHILIZED, FOR SOLUTION INTRAMUSCULAR at 16:17

## 2025-02-10 ENCOUNTER — TELEPHONE (OUTPATIENT)
Dept: BEHAVIORAL HEALTH | Facility: CLINIC | Age: 70
End: 2025-02-10
Payer: MEDICARE

## 2025-02-10 DIAGNOSIS — F98.8 ATTENTION DEFICIT DISORDER OF ADULT: ICD-10-CM

## 2025-02-10 RX ORDER — DEXTROAMPHETAMINE SACCHARATE, AMPHETAMINE ASPARTATE, DEXTROAMPHETAMINE SULFATE AND AMPHETAMINE SULFATE 2.5; 2.5; 2.5; 2.5 MG/1; MG/1; MG/1; MG/1
10 TABLET ORAL 2 TIMES DAILY
Qty: 60 TABLET | Refills: 0 | Status: SHIPPED | OUTPATIENT
Start: 2025-02-10 | End: 2025-03-12

## 2025-02-10 NOTE — PROGRESS NOTES
Nurse called to inform pt prescription has been refilled by provider and sent to pharmacy on file.

## 2025-02-14 DIAGNOSIS — I10 HYPERTENSION, UNSPECIFIED TYPE: ICD-10-CM

## 2025-02-16 RX ORDER — SPIRONOLACTONE 50 MG/1
50 TABLET, FILM COATED ORAL DAILY
Qty: 90 TABLET | Refills: 1 | Status: SHIPPED | OUTPATIENT
Start: 2025-02-16

## 2025-02-17 DIAGNOSIS — I10 HYPERTENSION, UNSPECIFIED TYPE: ICD-10-CM

## 2025-02-17 RX ORDER — AMLODIPINE BESYLATE 10 MG/1
10 TABLET ORAL DAILY
Qty: 90 TABLET | Refills: 1 | Status: SHIPPED | OUTPATIENT
Start: 2025-02-17

## 2025-02-27 ENCOUNTER — APPOINTMENT (OUTPATIENT)
Dept: BEHAVIORAL HEALTH | Facility: CLINIC | Age: 70
End: 2025-02-27
Payer: MEDICARE

## 2025-02-27 VITALS
HEIGHT: 66 IN | TEMPERATURE: 98 F | DIASTOLIC BLOOD PRESSURE: 78 MMHG | RESPIRATION RATE: 18 BRPM | WEIGHT: 108.4 LBS | HEART RATE: 84 BPM | BODY MASS INDEX: 17.42 KG/M2 | SYSTOLIC BLOOD PRESSURE: 156 MMHG

## 2025-02-27 DIAGNOSIS — F41.8 MIXED ANXIETY AND DEPRESSIVE DISORDER: ICD-10-CM

## 2025-02-27 DIAGNOSIS — F90.2 ATTENTION DEFICIT HYPERACTIVITY DISORDER (ADHD), COMBINED TYPE: ICD-10-CM

## 2025-02-27 PROCEDURE — 3077F SYST BP >= 140 MM HG: CPT | Performed by: PSYCHIATRY & NEUROLOGY

## 2025-02-27 PROCEDURE — 3008F BODY MASS INDEX DOCD: CPT | Performed by: PSYCHIATRY & NEUROLOGY

## 2025-02-27 PROCEDURE — 3078F DIAST BP <80 MM HG: CPT | Performed by: PSYCHIATRY & NEUROLOGY

## 2025-02-27 PROCEDURE — 1126F AMNT PAIN NOTED NONE PRSNT: CPT | Performed by: PSYCHIATRY & NEUROLOGY

## 2025-02-27 PROCEDURE — 99214 OFFICE O/P EST MOD 30 MIN: CPT | Performed by: PSYCHIATRY & NEUROLOGY

## 2025-02-27 ASSESSMENT — PAIN SCALES - GENERAL: PAINLEVEL_OUTOF10: 0-NO PAIN

## 2025-02-27 NOTE — PROGRESS NOTES
Subjective   Patient ID: Tyler Tesfaye is a 69 y.o. female who presents for No chief complaint on file. I am doing well.    Patient ID: Tyler Tesfaye is a 68 y.o. female who presents for No chief complaint on file. I am doing better.      Chief Complaint     Face - To - Face Visit.   I am doing better.      Adult Risk Screening     Spiritual and Cultural: Patient Declined.   Initial Fall Risk Screening:   TYLER has not fallen in the last 6 months.~Her fall did not result in injury.~TYLER does not have a fear of falling.~She does not need assistance with sitting, standing or walking.~Does not need assistance walking in her home.~She does not need assistance in an unfamiliar setting.~The patient is not using an assistive device.       Healthcare POA: Health care proxy on file.~Did not bring.   Declaration of Mental Health Treatment: No mental health treatment on file.    Tobacco Screening: TYLER does not use tobacco.~Has not used tobacco in the past 6 months.~Has not tried to quit or thought about quitting tobacco.   Domestic Violence Screen: Does not feel threatened or abused physically, emotionally or sexually. Do you feel UNSAFE?   The patient feels safe in the home.   Depression/Suicide Screening:   During the past 2 weeks, the patient has not felt down, depressed or hopeless.~   During the past 2 weeks, the patient has not felt little interest or pleasure in doing things.~   She does not have a risk of suicide.~   She has not had thoughts of harming others.    Single alcohol screening question:   In the past year the patient has had 5 or more drinks (men) or 4 or more drinks (women)? 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   Single substance abuse screening question:   In the past year the patient has used a recreational drug or used a prescription drug for non-medical reasons? None. time(s).   Procedure or Sedation Areas:   patient has not had alcohol, recreational drugs, or prescription drugs  for non-medical reasons this morning.   Nutrition Screening:   In the past month, there was not a day when I or anyone in my family went hungry because there was not enough food.   Patient Education: The patient denies that they or the person with them has problems with hearing, speaking, seeing, moving around or learning   The patient is comfortable filling out medical forms.   Social Determinant of Health   Does the patient have an SDoH need as identified by the screening form? No.~   Does the patient want intervention? No.   Food Insecurity:   1. Within the past 12 months, you worried that your food would run out before you got money to buy more: No   2. Within the past 12 months, the food you bought just didn't last and you didn't have money to get more: No      History of Present Illness  Ms. TYLER LAMB denies any suicidal or homicidal ideation or plans.  The patient reports depressed and anxious features are improved.   The patient reports doing well at the present time.        I have personally reviewed the [place] report for TYLER LAMB. I have considered the risks of abuse, dependence, addiction and diversion.   I have the following concerns: No irregularities evidenced.   Is the patient prescribed a combination of a benzodiazepine and opioid? No.   Last urine drug screening date/ordered today: 8/8/23   Controlled Substance Agreement:   I have printed this form and reviewed each line item with the patient and the patient has verbalized understanding.   Date of the last Controlled Substance Agreement: 8/8/23      Review of Systems     Depressive Symptoms: not depressed or irritable,~no loss of interest,~no change in appetite,~no recent lb weight gain,~no recent lb weight loss,~no insomnia,~no fatigue or loss of energy,~not feeling worthless or guilty,~normal concentration,~ability to make decisions,~no suicidal ideation,~no guns or weapons in household.   Manic Symptoms: mood is not irritable or  elevated,~self esteem is not grandiose or increased,~no changes in need for sleep,~not more talkative than usual,~does not have flight of ideas or racing thoughts,~no distractibility,~no psychomotor agitation or increased goal-directed activity,~no excessive involvement in pleasurable activities.   Psychotic Symptoms: no hallucinations,~no delusions,~no disorganized speech,~does not have disorganized behavior or catatonia,~no negative symptoms.   Anxiety Symptoms: excessive worry, but~no panic attacks,~no concerns about future panic attacks,~no worry about panic attack consequences,~no change in behavior due to panic attacks,~no difficulty controlling worry,~no increase in arousal,~not easily fatigued due to worry,~no difficulty concentrating due to worry,~no irritability due to worry,~no muscle tension due to worry,~no sleep disturbances due to worry,~no specific phobia,~no social phobia,~no obsessions,~no compulsions,~no exposure to traumatic event,~no re-experiencing of traumatic event,~no avoidance of stimuli and number of responsiveness,~no restlessness / feeling on edge due to worry.   Delirium/ Altered Mental Status Symptoms: no disturbances of consciousness,~no diminished ability to focus, sustain, shift attention,~no change in cognition or perceptual disturbances,~symptoms do not fluctuate during the course of the day,~general medical condition is not present.   Post-traumatic stress disorder symptoms no flashbacks,~no intrusive thoughts,~no avoiding triggers,~no emotional numbing,~not feeling detached,~no disinterest in life,~no relationship problems,~no hopelessness,~no fearfulness,~no startles easily,~no irritability,~no agitation,~no inability to concentrate,~no hypervigilance,~no sleep disturbance,~no nightmares.   Inattentive Symptoms: is not often forgetful.   Other Symptoms/ Concerns: no symptoms of separation anxiety,~no reactive attachment symptoms,~no motor tics,~no vocal tics,~no stuttering,~no  phonological problems,~no loss of urine control,~no encopresis,~no intellectual disability,~no self-injurious behaviors,~not somatic and no conversion symptoms,~no gender identity symptoms,~no sleep disorder symptoms,~no impulse control symptoms,~no personality disorder symptoms.   All other systems have been reviewed and are negative for complaint.      Constitutional: no sleep apnea,~normal sleeping,~no night wakings,~no snoring,~not a picky eater,~normal appetite,~no swallowing problems,~no night terrors,~no nightmares,~no restless sleep,~no snorts/gasps~and~no obesity.   ENT: no hearing tested~and~no hearing loss.   Cardiovascular: no murmur,~no heart defect~and~no chest pain.   Respiratory: no wheezing.   Gastrointestinal: no constipation,~no abdominal pain,~no nausea,~no vomiting~and~no diarrhea.   Genitourinary: no nocturnal enuresis~and~no diurnal enuresis.   Musculoskeletal: moving all extremities well and symmetrical.   Integumentary: no changes in moles or birthmarks~and~no rashes.   Neurological: symmetrical facies, but~no headache,~no head injury,~no seizures,~no staring spells,~no loss of consciousness,~no meningitis/encephalitis,~no cerebral palsy,~no spina bifida,~no stereotypy,~no developmental regression~and~no tics or twitches.   All other systems have been reviewed and are negative for complaint.      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)   · Depression with anxiety (300.4) (F41.8)   · Dysuria (788.1) (R30.0)   · Encounter for immunization (V03.89) (Z23)   · Encounter to establish care (V65.8) (Z76.89)   · HLD (hyperlipidemia) (272.4) (E78.5)   · HTN (hypertension) (401.9) (I10)    · Hydronephrosis (591) (N13.30)   · Incipient senile cataract (366.12) (H25.099)   · Medication management (V58.69) (Z79.899)   · Nephrolithiasis (592.0) (N20.0)   · Nervously anxious (799.21) (R45.0)   · Palpitations (785.1) (R00.2)   · Renal mass (593.9) (N28.89)   · Screening mammography declined (V64.2) (Z53.20)   · Stage 3a chronic kidney disease (585.3) (N18.31)   · Stage 3a chronic kidney disease   · Visit for screening mammogram (V76.12) (Z12.31)     Past Medical History     Past Psychiatric History   Inpatient history: None   Suicide attempts/Self-Harm/Ideation History: None   Current providers: None      Surgical History  Problems    · History of Ptosis repair     Family History  Mother    · Family history of Alcohol use   · Family history of cataracts (V19.19) (Z83.518)   · Family history of hypertension (V17.49) (Z82.49)   · Family history of senile dementia (V17.2) (Z81.8)  Father    · Family history of Alcohol abuse   · Family history of hypertension (V17.49) (Z82.49)   · Family history of myocardial infarction (V17.3) (Z82.49)   · Fatal MI age 55 years  Son    · Family history of Alcohol abuse  Sister    · Family history of Alcohol abuse   · Family history of hypertension (V17.49) (Z82.49)  Brother    · Family history of CABG (V17.49) (Z82.49)   · CABG age 73 years   · Family history of hypertension (V17.49) (Z82.49)     Social History  Problems    · Activities: Bicycling   · Activities: Hiking   · Born in [place]   · Completed college, bachelors degree   · Obtained a communication degree.   · Completed elementary school   · Completed high school   · Daily caffeine consumption   · Does not smoke (V49.89) (Z78.9)   · Does not use illicit drugs (V49.89) (Z78.9)   · Employed   · Employed for 35 years in [employer]   · Exercise: Walking   · Exercises regularly   · Never smoked cigarettes (V49.89) (Z78.9)   · No alcohol use     Allergies  Medication    · No Known Drug Allergies   Recorded By: Annemarie Severino;  3/11/2021 2:49:54 PM     Current Meds     Medication Name Instruction  amLODIPine Besylate 10 MG Oral Tablet TAKE 1 TABLET BY MOUTH DAILY  Amphetamine-Dextroamphetamine 10 MG Oral Tablet One tablet every morning.  hydrOXYzine HCl - 25 MG Oral Tablet    Lovastatin 20 MG Oral Tablet TAKE 1 TABLET DAILY at bedtime  Sertraline HCl - 100 MG Oral Tablet Take one tablet by mouth every day with food.  Spironolactone 50 MG Oral Tablet TAKE 1 TABLET BY MOUTH DAILY     Tobacco Use b) No  PHQ-2  #1. Over the last 2 weeks have you felt down, depressed or hopeless?  (If yes, answer PHQ-9 below) No  PHQ-2  #2. Over the last 2 weeks have you felt little interest  or pleasure in doing things?  (If yes, answer PHQ-9 below) No  Falls Screening (Age 18+) a) No falls within the last year  O2 Saturation 95  Pain Scale 0     Mental Status Exam     General: Appropriately groomed and dressed~In no acute distress.   Appearance: Depressed and anxious features are improved.   Attitude: Cooperative.   Behavior: Depressed and anxious features are improved.   Motor Activity: No agitation or retardation. No EPS/TD. Normal gait and station.   Speech: Regular rate, rhythm, volume and tone, spontaneous, fluent.   Mood: Depressed and anxious features are improved.   Affect: Depressed and anxious features are improved.   Thought Process: Depressed and anxious features are improved.   Thought Content: Depressed and anxious features are improved.   Thought Perception: Does not endorse auditory or visual hallucinations, does not appear to be responding to hallucinatory stimuli.   Cognition: Depressed and anxious features are improved.   Insight: Insight is limited.   Judgment: Judgment is limited.         Appearance: well-groomed.   Build: average.   Demeanor: average.   Eye Contact: average.   Motor Activity: average.   Speech: clear.   Language: Neurologic language is intact.   Fund of Knowledge: aware of current events,~fair fund of knowledge.    Delusions: None Reported.   Self Harm: None Reported.   Aggressive: None Reported.   Mood: Depressed and anxious in substantial.   Affect: Euthymic   Orientation: alert,~oriented x3.   Manner: cooperative.   Thought process: goal-directed.   Thought association: displays rational thought process.   Content of thought: Ms. TYLER LAMB denies any suicidal or homicidal ideation or plans.   Abstract/ Rational Thought: intact   Memory: grossly intact.   Behavior:. Depressed and anxious in substantial.   Attention/Concentration:. Doing well.   Cognition: intact.   Intelligence Estimate: average.   Executive Function: intact.   Insight:. Depressed and anxious in substantial.   Judgement:. Depressed and anxious in substantial.      MSE: The patient is alert, fully oriented, language is intact, and recent and remote memory intact. The patient denies any suicidal or homicidal ideation or plans. The patient presents with no depressive, manic or psychotic symptoms. Thought is logical and clear. No disturbances of judgment or insight are exhibited. No behavioral disturbances are present on examination.     The patient reports that he is doing better.         Review of Systems   Neurological:         Mental Status Exam     General: Appropriately groomed and dressed~In no acute distress.   Appearance: Depressed and anxious features are improved.   Attitude: Cooperative.   Behavior: Depressed and anxious features are improved.   Motor Activity: No agitation or retardation. No EPS/TD. Normal gait and station.   Speech: Regular rate, rhythm, volume and tone, spontaneous, fluent.   Mood: Depressed and anxious features are improved.   Affect: Depressed and anxious features are improved.   Thought Process: Depressed and anxious features are improved.   Thought Content: Depressed and anxious features are improved.   Thought Perception: Does not endorse auditory or visual hallucinations, does not appear to be responding to  hallucinatory stimuli.   Cognition: Depressed and anxious features are improved.   Insight: Insight is limited.   Judgment: Judgment is limited.         Appearance: well-groomed.   Build: average.   Demeanor: average.   Eye Contact: average.   Motor Activity: average.   Speech: clear.   Language: Neurologic language is intact.   Fund of Knowledge: aware of current events,~fair fund of knowledge.   Delusions: None Reported.   Self Harm: None Reported.   Aggressive: None Reported.   Mood: Depressed and anxious in substantial.   Affect: Euthymic   Orientation: alert,~oriented x3.   Manner: cooperative.   Thought process: goal-directed.   Thought association: displays rational thought process.   Content of thought: Ms. TYLER LAMB denies any suicidal or homicidal ideation or plans.   Abstract/ Rational Thought: intact   Memory: grossly intact.   Behavior:. Depressed and anxious in substantial.   Attention/Concentration:. Doing well.   Cognition: intact.   Intelligence Estimate: average.   Executive Function: intact.   Insight:. Depressed and anxious in substantial.   Judgement:. Depressed and anxious in substantial.      MSE: The patient is alert, fully oriented, language is intact, and recent and remote memory intact. The patient denies any suicidal or homicidal ideation or plans. The patient presents with no depressive, manic or psychotic symptoms. Thought is logical and clear. No disturbances of judgment or insight are exhibited. No behavioral disturbances are present on examination.     The patient reports that he is doing better.      Psychiatric/Behavioral:          Mental Status Exam     General: Appropriately groomed and dressed~In no acute distress.   Appearance: Depressed and anxious features are improved.   Attitude: Cooperative.   Behavior: Depressed and anxious features are improved.   Motor Activity: No agitation or retardation. No EPS/TD. Normal gait and station.   Speech: Regular rate, rhythm, volume  and tone, spontaneous, fluent.   Mood: Depressed and anxious features are improved.   Affect: Depressed and anxious features are improved.   Thought Process: Depressed and anxious features are improved.   Thought Content: Depressed and anxious features are improved.   Thought Perception: Does not endorse auditory or visual hallucinations, does not appear to be responding to hallucinatory stimuli.   Cognition: Depressed and anxious features are improved.   Insight: Insight is limited.   Judgment: Judgment is limited.         Appearance: well-groomed.   Build: average.   Demeanor: average.   Eye Contact: average.   Motor Activity: average.   Speech: clear.   Language: Neurologic language is intact.   Fund of Knowledge: aware of current events,~fair fund of knowledge.   Delusions: None Reported.   Self Harm: None Reported.   Aggressive: None Reported.   Mood: Depressed and anxious in substantial.   Affect: Euthymic   Orientation: alert,~oriented x3.   Manner: cooperative.   Thought process: goal-directed.   Thought association: displays rational thought process.   Content of thought: Ms. TYLER LAMB denies any suicidal or homicidal ideation or plans.   Abstract/ Rational Thought: intact   Memory: grossly intact.   Behavior:. Depressed and anxious in substantial.   Attention/Concentration:. Doing well.   Cognition: intact.   Intelligence Estimate: average.   Executive Function: intact.   Insight:. Depressed and anxious in substantial.   Judgement:. Depressed and anxious in substantial.      MSE: The patient is alert, fully oriented, language is intact, and recent and remote memory intact. The patient denies any suicidal or homicidal ideation or plans. The patient presents with no depressive, manic or psychotic symptoms. Thought is logical and clear. No disturbances of judgment or insight are exhibited. No behavioral disturbances are present on examination.     The patient reports that he is doing better.        Psych  Review of Symptoms:    ADHD: Patient denied any symptoms.         Anxiety: Patient denied any symptoms.         Developmental and Sensory Concerns: Patient denied any symptoms.         Depressive Symptoms: Patient denied any symptoms.         Disruptive and Conduct Symptoms: Patient denied any symptoms.         Eating / Feeding Concerns: Patient denied any symptoms.         Elimination Symptoms: Patient denied any symptoms.         Manic Symptoms: Patient denied any symptoms.         Obsessive-Compulsive Symptoms: Patient denied any symptoms.         Psychotic Symptoms: Patient denied any symptoms.           Trauma Related Symptoms: Patient denied any symptoms.           Sleep Concerns: Patient denied any symptoms.             Objective   Physical Exam  Neurological:      Mental Status: She is oriented to person, place, and time.      Comments: Mental Status Exam     General: Appropriately groomed and dressed~In no acute distress.   Appearance: Depressed and anxious features are improved.   Attitude: Cooperative.   Behavior: Depressed and anxious features are improved.   Motor Activity: No agitation or retardation. No EPS/TD. Normal gait and station.   Speech: Regular rate, rhythm, volume and tone, spontaneous, fluent.   Mood: Depressed and anxious features are improved.   Affect: Depressed and anxious features are improved.   Thought Process: Depressed and anxious features are improved.   Thought Content: Depressed and anxious features are improved.   Thought Perception: Does not endorse auditory or visual hallucinations, does not appear to be responding to hallucinatory stimuli.   Cognition: Depressed and anxious features are improved.   Insight: Insight is limited.   Judgment: Judgment is limited.         Appearance: well-groomed.   Build: average.   Demeanor: average.   Eye Contact: average.   Motor Activity: average.   Speech: clear.   Language: Neurologic language is intact.   Fund of Knowledge: aware of  current events,~fair fund of knowledge.   Delusions: None Reported.   Self Harm: None Reported.   Aggressive: None Reported.   Mood: Depressed and anxious in substantial.   Affect: Euthymic   Orientation: alert,~oriented x3.   Manner: cooperative.   Thought process: goal-directed.   Thought association: displays rational thought process.   Content of thought: Ms. TYLER LAMB denies any suicidal or homicidal ideation or plans.   Abstract/ Rational Thought: intact   Memory: grossly intact.   Behavior:. Depressed and anxious in substantial.   Attention/Concentration:. Doing well.   Cognition: intact.   Intelligence Estimate: average.   Executive Function: intact.   Insight:. Depressed and anxious in substantial.   Judgement:. Depressed and anxious in substantial.      MSE: The patient is alert, fully oriented, language is intact, and recent and remote memory intact. The patient denies any suicidal or homicidal ideation or plans. The patient presents with no depressive, manic or psychotic symptoms. Thought is logical and clear. No disturbances of judgment or insight are exhibited. No behavioral disturbances are present on examination.     The patient reports that he is doing better.      Psychiatric:         Mood and Affect: Mood normal.         Behavior: Behavior normal.         Thought Content: Thought content normal.         Judgment: Judgment normal.      Comments: Mental Status Exam     General: Appropriately groomed and dressed~In no acute distress.   Appearance: Depressed and anxious features are improved.   Attitude: Cooperative.   Behavior: Depressed and anxious features are improved.   Motor Activity: No agitation or retardation. No EPS/TD. Normal gait and station.   Speech: Regular rate, rhythm, volume and tone, spontaneous, fluent.   Mood: Depressed and anxious features are improved.   Affect: Depressed and anxious features are improved.   Thought Process: Depressed and anxious features are improved.    Thought Content: Depressed and anxious features are improved.   Thought Perception: Does not endorse auditory or visual hallucinations, does not appear to be responding to hallucinatory stimuli.   Cognition: Depressed and anxious features are improved.   Insight: Insight is limited.   Judgment: Judgment is limited.         Appearance: well-groomed.   Build: average.   Demeanor: average.   Eye Contact: average.   Motor Activity: average.   Speech: clear.   Language: Neurologic language is intact.   Fund of Knowledge: aware of current events,~fair fund of knowledge.   Delusions: None Reported.   Self Harm: None Reported.   Aggressive: None Reported.   Mood: Depressed and anxious in substantial.   Affect: Euthymic   Orientation: alert,~oriented x3.   Manner: cooperative.   Thought process: goal-directed.   Thought association: displays rational thought process.   Content of thought: Ms. TYLER LAMB denies any suicidal or homicidal ideation or plans.   Abstract/ Rational Thought: intact   Memory: grossly intact.   Behavior:. Depressed and anxious in substantial.   Attention/Concentration:. Doing well.   Cognition: intact.   Intelligence Estimate: average.   Executive Function: intact.   Insight:. Depressed and anxious in substantial.   Judgement:. Depressed and anxious in substantial.      MSE: The patient is alert, fully oriented, language is intact, and recent and remote memory intact. The patient denies any suicidal or homicidal ideation or plans. The patient presents with no depressive, manic or psychotic symptoms. Thought is logical and clear. No disturbances of judgment or insight are exhibited. No behavioral disturbances are present on examination.     The patient reports that he is doing better.            Lab Review:   Lab on 10/02/2024   Component Date Value    Methamphetamine Quant, Ur 10/02/2024 <200     MDA, Urine 10/02/2024 <200     MDEA, Urine 10/02/2024 <200     Phentermine,Urine 10/02/2024 <200      Amphetamines,Urine 10/02/2024 >5000     MDMA, Urine 10/02/2024 <200        Assessment/Plan   Psychiatric Risk Assessment  Violence Risk Assessment: none  Acute Risk of Harm to Others is Considered: low   Suicide Risk Assessment: age > 65 yrs old and   Protective Factors against Suicide: adherence to  treatment, fear of suicide, moral objections to suicide, positive family relationships, and sense of responsibility toward family  Acute Risk of Harm to Self is Considered: low    Imminent Risk of Suicide or Serious Self-Injury: Low   Chronic Risk of Suicide of Serious Self-Injury: Low  Risk factors: Age, depression history and   Protective factors: Denies current suicidal ideation, denies history of suicide attempts , willingness to seek help and support , gender, access to a variety of clinical interventions , and receiving and engaged in care for mental, physical, and substance use disorders      Imminent Risk of Violence or Homicide: Low   Risk Factors: No significant risk factors identified on screening  Protective Factors: Lack of known history of harm to others , Lack of known history of violent ideation , and lack of known access to firearms.     Assessment & Plan     Diagnoses/Problems  Assessed    · Depression with anxiety (300.4) (F41.8)   · Attention deficit disorder of adult (314.00) (F98.8)   · Anxious mood (300.00) (F41.9)   · Medication management (V58.69) (Z79.899)     Orders  Anxious mood, Depression with anxiety    · Renew: Sertraline HCl - 100 MG Oral Tablet; Take one tablet by mouth every day with food  Medication management    · Amphetamine Confirm, Urine; Status:Active; Requested for:[id number];      Patient Discussion/Summary     Your diagnosis is depression with anxiety improved but you report life long attention disturbance from childhood untreated and are seeking treatment.     Please follow up in person at [facility] and at the [facility] () virtually in June  of 2025 and in person  in October of 2025  and sooner virtually if needed as discussed today. For all urgent or emergency matters please call 911, go to urgent care or the emergency department of the nearest hospital.     [place] was reviewed today with no concerning findings evidenced.     You completed your urine amphetamine drug screen on August 2, 2024.     Continue sertraline to 100 mg daily with food. You clearly are able to recite back the risks, benefits and alternatives of these medications including the serotonin syndrome when combining sertraline with Adderall as discussed with you today. You report doing well and we shall monitor and follow up further at your next appointment but you have declined any reductions of sertraline or Adderall at this time.     The FDA risks of antidepressants including increased risk of suicide, cardiotoxicity, weight gain, lowered seizure threshold, the serotonin syndrome with serotonin agents, individually or with multiple serotonin agent interaction and anticholinergic effects have been discussed. We also discussed that at least in the case of SSRIs there is interference with warfarin and nonsteroidal inflammatories and can cause increased bleeding and hemorrhage.     Continue amphetamine- dextroamphetamine 10 mg in the morning and at noon daily for now.     Time:   Prep time on date of the patient encounter: 5 minutes.   Time spent directly with patient/family/caregiver: 20 minutes.   Additional time spent on patient care activities:  minutes.   Documentation time: 5 minutes.   Total time on date of patient encounter:  minutes.

## 2025-03-05 ENCOUNTER — APPOINTMENT (OUTPATIENT)
Dept: OTOLARYNGOLOGY | Facility: CLINIC | Age: 70
End: 2025-03-05
Payer: MEDICARE

## 2025-03-12 DIAGNOSIS — F98.8 ATTENTION DEFICIT DISORDER OF ADULT: ICD-10-CM

## 2025-03-12 RX ORDER — DEXTROAMPHETAMINE SACCHARATE, AMPHETAMINE ASPARTATE, DEXTROAMPHETAMINE SULFATE AND AMPHETAMINE SULFATE 2.5; 2.5; 2.5; 2.5 MG/1; MG/1; MG/1; MG/1
10 TABLET ORAL 2 TIMES DAILY
Qty: 60 TABLET | Refills: 0 | Status: SHIPPED | OUTPATIENT
Start: 2025-03-12 | End: 2025-04-11

## 2025-03-20 DIAGNOSIS — I10 PRIMARY HYPERTENSION: ICD-10-CM

## 2025-03-21 RX ORDER — SPIRONOLACTONE 25 MG/1
25 TABLET ORAL DAILY
Qty: 90 TABLET | Refills: 1 | Status: SHIPPED | OUTPATIENT
Start: 2025-03-21

## 2025-04-02 ENCOUNTER — APPOINTMENT (OUTPATIENT)
Dept: OTOLARYNGOLOGY | Facility: CLINIC | Age: 70
End: 2025-04-02
Payer: MEDICARE

## 2025-04-02 VITALS — BODY MASS INDEX: 17.64 KG/M2 | HEIGHT: 66 IN | WEIGHT: 109.8 LBS

## 2025-04-02 DIAGNOSIS — G24.5 BLEPHAROSPASM: ICD-10-CM

## 2025-04-02 PROCEDURE — 64612 DESTROY NERVE FACE MUSCLE: CPT | Performed by: PHYSICIAN ASSISTANT

## 2025-04-02 RX ORDER — BOTULINUM TOXIN TYPE A 300 U/1
154 INJECTION, POWDER, LYOPHILIZED, FOR SOLUTION INTRAMUSCULAR ONCE
Status: COMPLETED | OUTPATIENT
Start: 2025-04-02 | End: 2025-04-02

## 2025-04-02 RX ADMIN — BOTULINUM TOXIN TYPE A 154 UNITS: 300 INJECTION, POWDER, LYOPHILIZED, FOR SOLUTION INTRAMUSCULAR at 15:09

## 2025-04-02 ASSESSMENT — PAIN SCALES - GENERAL: PAINLEVEL_OUTOF10: 0-NO PAIN

## 2025-04-02 NOTE — PROGRESS NOTES
Facial Plastic & Reconstructive Surgery    4/2/25  DYSPORT INJECTION  Last DSYPORT INJECTION 2/5/25  Dx: Benign blepharospasm, facial synkinesia, dyskinesia    INDICATIONS AND CONSENT  Annamarie Tesfaye presents with facial synkinesis and dyskinesia. The patient was offered the above procedures and after the risks, benefits, alternatives, and limitations to the procedure were discussed, the patient elected to proceed.  Risks discussed included but were not limited to bruising, lack of effect, ptosis, or an undesirable outcome including contour irregularities.  Informed consent was obtained.     DESCRIPTION OF PROCEDURE:  Using sterile technique, the injections were performed as follows:     DYSPORT was injected into the bilateral orbicularis oculi, bilateral mentalis, bilateral corrugatrs.  A total of 154 units was injected by Dr. Davila.     The precise locations and amounts were recorded on the facial map.  The patient tolerated the procedure well without any complications.    Plan: Continue chemodenervation q 2 months    ------------------------------------  2/5/25 DYSPORT INJECTION  Last DSYPORT INJECTION 12/18/24   Dx: Benign blepharospasm, facial synkinesia, dyskinesia    INDICATIONS AND CONSENT  Annamarie Tesfaye presents with facial synkinesis and dyskinesia. The patient was offered the above procedures and after the risks, benefits, alternatives, and limitations to the procedure were discussed, the patient elected to proceed.  Risks discussed included but were not limited to bruising, lack of effect, ptosis, or an undesirable outcome including contour irregularities.  Informed consent was obtained.     DESCRIPTION OF PROCEDURE:  Using sterile technique, the injections were performed as follows:     DYSPORT was injected into the bilateral orbicularis oculi, bilateral mentalis, bilateral corrugatrs.  A total of 154 units was injected by Dr. Davila.     The precise locations and amounts were recorded on the  facial map.  The patient tolerated the procedure well without any complications.    Plan: Continue chemodenervation q 2 months         -----------------------------------  12/18/24 DYSPORT INJECTION  Last DSYPORT INJECTION 10/16/24   Dx: Benign blepharospasm, facial synkinesia, dyskinesia    INDICATIONS AND CONSENT  Annamarie Tesfaye presents with facial synkinesis and dyskinesia. The patient was offered the above procedures and after the risks, benefits, alternatives, and limitations to the procedure were discussed, the patient elected to proceed.  Risks discussed included but were not limited to bruising, lack of effect, ptosis, or an undesirable outcome including contour irregularities.  Informed consent was obtained.     DESCRIPTION OF PROCEDURE:  Using sterile technique, the injections were performed as follows:     DYSPORT was injected into the bilateral orbicularis oculi, bilateral mentalis, bilateral corrugatrs.  A total of 154 units was injected by Dr. Davila.     The precise locations and amounts were recorded on the facial map.  The patient tolerated the procedure well without any complications.       -------------------------------------------------------------------  10/16/24 DYSPORT INJECTION  Dx: Benign blepharospasm, facial synkinesia, dyskinesia    INDICATIONS AND CONSENT  Annamarie Tesfaye presents with facial synkinesis and dyskinesia. The patient was offered the above procedures and after the risks, benefits, alternatives, and limitations to the procedure were discussed, the patient elected to proceed.  Risks discussed included but were not limited to bruising, lack of effect, ptosis, or an undesirable outcome including contour irregularities.  Informed consent was obtained.     DESCRIPTION OF PROCEDURE:  Using sterile technique, the injections were performed as follows:     DYSPORT was injected into the bilateral orbicularis oculi, bilateral mentalis, bilateral corrugatrs.  A total of 154 units was  injected by Dr. Davila.     The precise locations and amounts were recorded on the facial map.  The patient tolerated the procedure well without any complications.          -----------------------------------------------  8/21/24 DYSPORT INJECTION    Dysport injection  INDICATIONS AND CONSENT  Annamarie Tesfaye presents with facial synkinesis and dyskinesia. The patient was offered the above procedures and after the risks, benefits, alternatives, and limitations to the procedure were discussed, the patient elected to proceed.  Risks discussed included but were not limited to bruising, lack of effect, ptosis, or an undesirable outcome including contour irregularities.  Informed consent was obtained.     DESCRIPTION OF PROCEDURE:  Using sterile technique, the injections were performed as follows:     Dysport was injected into the bilateral orbicularis oculi, bilateral zygomaticus, bilateral corrugators, bilateral mentalis.  A total of 158 units was injected.    The precise locations and amounts were recorded on the facial map.  The patient tolerated the procedure well without any complications.         Plan: Continue denervation with DYSPORT in 3 months.  Discussed possible future surgery for lower face to include potential jowel implants, midface lift, neck lift. Patient to think about this and get back to us.     Jennifer Shahid PA-C     ------------------------------------------------  6/19/24  DYSPORT INJECTION    Xeomin trial failed per VV 4/30/24    Dysport injection  INDICATIONS AND CONSENT  Annamarie Tesfaye presents with facial synkinesis and dyskinesia. The patient was offered the above procedures and after the risks, benefits, alternatives, and limitations to the procedure were discussed, the patient elected to proceed.  Risks discussed included but were not limited to bruising, lack of effect, ptosis, or an undesirable outcome including contour irregularities.  Informed consent was obtained.     DESCRIPTION OF  PROCEDURE:  Using sterile technique, the injections were performed as follows:     Dysport was injected into the bilateral orbicularis oculi, bilateral zygomaticus, bilateral orbicularis oris, bilateral corrugators, bilateral mentalis.  A total of 162 units was injected.    The precise locations and amounts were recorded on the facial map.  The patient tolerated the procedure well without any complications.         -----------------------------------------  Returns for VV 4/30/24  Unfortunately limited response to last injection with xeomin with results fading after 2-3 weeks. We discussed trial of dysport over xeomin in the future. She has already failed to achieve results with botox as well.    Will re-attempt in 4 weeks with dysport instead of xeomin.    This visit was conducted by means of a two-way synchronous audio-only connection, and technical limitations of providing care through this modality, including that audio-only telephone technology may not always be HIPAA-compliant, were explained to the patient. Patient has explicitly consented to this telephone visit. I communicated with the the patient on the phone during this visit, and more than 50% of the time was spent in counseling and coordination of care.    Total time spent on this visit: 15    Dx: Bilateral Blepharospasm, facial spasms  ----------------------------------------------------   4/3/24 XEOMIN  Injection   INDICATIONS AND CONSENT  Annamarie Tesfaye presents with bilateral blepharospasm. The patient was offered the above procedures and after the risks, benefits, alternatives, and limitations to the procedure were discussed, the patient elected to proceed.  Risks discussed included but were not limited to bruising, lack of effect, ptosis, or an undesirable outcome including contour irregularities.  Informed consent was obtained.      DESCRIPTION OF PROCEDURE:  Using sterile technique, the injections were performed as follows:      Xeomin was  injected into the bilateral orbicularis oris, bilateral corrugators, bilateral orbicularis oculi, bilateral procerus .  A total of 70 units was injected. 30 units were wasted.      The precise locations and amounts were recorded on the facial map.  The patient tolerated the procedure well without any complications.         -------------------------------------------------------     2/5/24: XEOMIN injection   Annamarie Tesfaye presents with benign blepharospasm. The patient was offered the above procedures and after the risks, benefits, alternatives, and limitations to the procedure were discussed, the patient elected to proceed.  Risks discussed included but were not limited to bruising, lack of effect, ptosis, or an undesirable outcome including contour irregularities.  Informed consent was obtained.      DESCRIPTION OF PROCEDURE:  Using sterile technique, the injections were performed as follows:      Xeomin was injected into the bl orbicularis oculi and corrugators..  A total of 56 units was injected. 44 units wasted     The precise locations and amounts were recorded on the facial map.  The patient tolerated the procedure well without any complications.       ----------------------------------------------------------------------     10/5/23: Xeomin was injected into the bl orbicularis oculi and corrugators.  A total of 56 units was injected. 44 units was wasted.

## 2025-04-09 ENCOUNTER — APPOINTMENT (OUTPATIENT)
Dept: PRIMARY CARE | Facility: CLINIC | Age: 70
End: 2025-04-09
Payer: MEDICARE

## 2025-04-10 DIAGNOSIS — F98.8 ATTENTION DEFICIT DISORDER OF ADULT: ICD-10-CM

## 2025-04-10 RX ORDER — DEXTROAMPHETAMINE SACCHARATE, AMPHETAMINE ASPARTATE, DEXTROAMPHETAMINE SULFATE AND AMPHETAMINE SULFATE 2.5; 2.5; 2.5; 2.5 MG/1; MG/1; MG/1; MG/1
10 TABLET ORAL 2 TIMES DAILY
Qty: 60 TABLET | Refills: 0 | Status: SHIPPED | OUTPATIENT
Start: 2025-04-10 | End: 2025-05-10

## 2025-04-18 LAB
ALBUMIN SERPL-MCNC: 5.1 G/DL (ref 3.6–5.1)
BUN SERPL-MCNC: 20 MG/DL (ref 7–25)
BUN/CREAT SERPL: 18 (CALC) (ref 6–22)
CALCIUM SERPL-MCNC: 10.4 MG/DL (ref 8.6–10.4)
CHLORIDE SERPL-SCNC: 101 MMOL/L (ref 98–110)
CO2 SERPL-SCNC: 24 MMOL/L (ref 20–32)
CREAT SERPL-MCNC: 1.14 MG/DL (ref 0.5–1.05)
EGFRCR SERPLBLD CKD-EPI 2021: 52 ML/MIN/1.73M2
GLUCOSE SERPL-MCNC: 95 MG/DL (ref 65–99)
PHOSPHATE SERPL-MCNC: 3.6 MG/DL (ref 2.1–4.3)
POTASSIUM SERPL-SCNC: 4.9 MMOL/L (ref 3.5–5.3)
PTH-INTACT SERPL-MCNC: 27 PG/ML (ref 16–77)
SODIUM SERPL-SCNC: 138 MMOL/L (ref 135–146)
URATE SERPL-MCNC: 5.9 MG/DL (ref 2.5–7)

## 2025-04-23 ENCOUNTER — APPOINTMENT (OUTPATIENT)
Dept: PRIMARY CARE | Facility: CLINIC | Age: 70
End: 2025-04-23
Payer: MEDICARE

## 2025-04-23 VITALS
HEART RATE: 85 BPM | DIASTOLIC BLOOD PRESSURE: 79 MMHG | SYSTOLIC BLOOD PRESSURE: 134 MMHG | HEIGHT: 66 IN | WEIGHT: 108.2 LBS | BODY MASS INDEX: 17.39 KG/M2 | OXYGEN SATURATION: 95 %

## 2025-04-23 DIAGNOSIS — E55.9 VITAMIN D INSUFFICIENCY: ICD-10-CM

## 2025-04-23 DIAGNOSIS — E78.2 MIXED HYPERLIPIDEMIA: ICD-10-CM

## 2025-04-23 DIAGNOSIS — Z00.00 MEDICARE ANNUAL WELLNESS VISIT, SUBSEQUENT: ICD-10-CM

## 2025-04-23 DIAGNOSIS — I10 PRIMARY HYPERTENSION: Primary | ICD-10-CM

## 2025-04-23 DIAGNOSIS — N18.31 STAGE 3A CHRONIC KIDNEY DISEASE (MULTI): ICD-10-CM

## 2025-04-23 PROCEDURE — 3008F BODY MASS INDEX DOCD: CPT | Performed by: STUDENT IN AN ORGANIZED HEALTH CARE EDUCATION/TRAINING PROGRAM

## 2025-04-23 PROCEDURE — 1036F TOBACCO NON-USER: CPT | Performed by: STUDENT IN AN ORGANIZED HEALTH CARE EDUCATION/TRAINING PROGRAM

## 2025-04-23 PROCEDURE — 1159F MED LIST DOCD IN RCRD: CPT | Performed by: STUDENT IN AN ORGANIZED HEALTH CARE EDUCATION/TRAINING PROGRAM

## 2025-04-23 PROCEDURE — 3075F SYST BP GE 130 - 139MM HG: CPT | Performed by: STUDENT IN AN ORGANIZED HEALTH CARE EDUCATION/TRAINING PROGRAM

## 2025-04-23 PROCEDURE — 99214 OFFICE O/P EST MOD 30 MIN: CPT | Performed by: STUDENT IN AN ORGANIZED HEALTH CARE EDUCATION/TRAINING PROGRAM

## 2025-04-23 PROCEDURE — 1160F RVW MEDS BY RX/DR IN RCRD: CPT | Performed by: STUDENT IN AN ORGANIZED HEALTH CARE EDUCATION/TRAINING PROGRAM

## 2025-04-23 PROCEDURE — 3078F DIAST BP <80 MM HG: CPT | Performed by: STUDENT IN AN ORGANIZED HEALTH CARE EDUCATION/TRAINING PROGRAM

## 2025-04-23 NOTE — PROGRESS NOTES
"Subjective   Patient ID: Annamarie Tesfaye is a 69 y.o. female who presents for Follow-up (6 month follow-up. ).        HPI  68year-old female with hypertension, anxiety with depression, attention deficit, CKD stage3.       Fu care of cmds             Visit Vitals  /79   Pulse 85   Ht 1.676 m (5' 6\")   Wt 49.1 kg (108 lb 3.2 oz)   SpO2 95%   BMI 17.46 kg/m²   OB Status Postmenopausal   Smoking Status Never   BSA 1.51 m²      No LMP recorded. Patient is postmenopausal.   Current Outpatient Medications   Medication Instructions    amLODIPine (NORVASC) 10 mg, oral, Daily    amphetamine-dextroamphetamine (Adderall) 10 mg tablet 10 mg, oral, 2 times daily    CALCIUM ORAL Take by mouth.    cyanocobalamin, vitamin B-12, (VITAMIN B-12 ORAL) Take by mouth.    lovastatin (MEVACOR) 20 mg, oral, Nightly    neomycin-polymyxin-dexAMETHasone (Maxitrol) 3.5mg/mL-10,000 unit/mL-0.1 % ophthalmic suspension     sertraline (ZOLOFT) 100 mg, oral, Daily    spironolactone (ALDACTONE) 50 mg, oral, Daily    spironolactone (ALDACTONE) 25 mg, oral, Daily, Along with the 50mg      Social History     Tobacco Use    Smoking status: Never     Passive exposure: Never    Smokeless tobacco: Never   Substance Use Topics    Alcohol use: Not Currently        Review of Systems    Constitutional : No feeling poorly / fevers/ chills / night sweats/ fatigue   Cardiovascular : No CP /Palpitations/ lower extremity edema / syncope     CNS: No confusion / HA/ tingling/ numbness/ weakness of extremities  Psychiatric: No anxiety/ depression/ SI/HI    All other systems have been reviewed and are negative for complaint       Physical Exam    Constitutional : Vitals reviewed. Alert and in no distress  Cardiovascular : RRR, Normal S1, S2, no peripheral edema   Pulmonary: No respiratory distress, CTAB   MSK : Normal gait and station , strength and tone   Skin: Warm to touch ,  normal skin turgor   Neurologic : CNs 2-12 grossly intact , no obvious FNDs  Psych : " A,Ox3, normal mood and affect      Assessment/Plan   Diagnoses and all orders for this visit:  Primary hypertension  -     CBC; Future  -     Comprehensive Metabolic Panel; Future  Stage 3a chronic kidney disease (Multi)  Vitamin D insufficiency  -     Vitamin D 25-Hydroxy,Total (for eval of Vitamin D levels); Future  Medicare annual wellness visit, subsequent  -     Hemoglobin A1C; Future  Mixed hyperlipidemia  -     Lipid Panel; Future  -     TSH with reflex to Free T4 if abnormal; Future      68year-old female with hypertension, anxiety with depression, attention deficit, CKD stage3.     Ckd stge 3 :stable   HTN: at goal   Raking leaves and mopping  washing floors is likely exertional activity , with no similar activity  on other days  Cardiac stress test 11/'23  reviewed, no ischemic changes , is reassuring .     Labs from 4/17 reviewed and discussed       Conditions addressed and mgmt as noted above.  Pertinent labs, images/ imaging reports , chart review was done .   Age appropriate labs / labs for mgmt of chronic medical conditions ordered, further mgmt pending the results.         RTO in  6m or sooner if needed . Labs to be done few days prior to the next visit.        This note is intended for the physician writing it, as well as to communicate findings to other healthcare professionals. These notes use medical lexicon that may be misunderstood by non medical persons. Therefore, interpretations of medical notes and terminology should be approached with caution.

## 2025-05-12 DIAGNOSIS — F98.8 ATTENTION DEFICIT DISORDER OF ADULT: ICD-10-CM

## 2025-05-12 RX ORDER — DEXTROAMPHETAMINE SACCHARATE, AMPHETAMINE ASPARTATE, DEXTROAMPHETAMINE SULFATE AND AMPHETAMINE SULFATE 2.5; 2.5; 2.5; 2.5 MG/1; MG/1; MG/1; MG/1
10 TABLET ORAL 2 TIMES DAILY
Qty: 60 TABLET | Refills: 0 | Status: SHIPPED | OUTPATIENT
Start: 2025-05-12 | End: 2025-06-11

## 2025-05-22 DIAGNOSIS — G24.5 BLEPHAROSPASM: Primary | ICD-10-CM

## 2025-06-02 NOTE — PROGRESS NOTES
Facial Plastic & Reconstructive Surgery    6/4/25    Last DSYPORT INJECTION 4/2/25  Dx: Benign blepharospasm, facial synkinesia, dyskinesia    INDICATIONS AND CONSENT  Annamarie Tesfaye presents with facial synkinesis and dyskinesia. The patient was offered the above procedures and after the risks, benefits, alternatives, and limitations to the procedure were discussed, the patient elected to proceed.  Risks discussed included but were not limited to bruising, lack of effect, ptosis, or an undesirable outcome including contour irregularities.  Informed consent was obtained.     DESCRIPTION OF PROCEDURE:  Using sterile technique, the injections were performed as follows:     DYSPORT was injected into the bilateral orbicularis oculi, bilateral mentalis, bilateral corrugatrs.  A total of 154 units was injected by Dr. Davila.     The precise locations and amounts were recorded on the facial map.  The patient tolerated the procedure well without any complications.    The patient continues to experience significant return of symptoms, including facial spasm and blepharospasm, within one month of chemodenervation. This is a shorter duration of effect than typically expected. Given the early recurrence of symptoms and associated functional impairment, the patient would benefit from more frequent treatments, approximately every 4 weeks, to maintain symptom control.    Plan: Continue chemodenervation q 1 months    -----------------------------  4/2/25  DYSPORT INJECTION  Last DSYPORT INJECTION 2/5/25  Dx: Benign blepharospasm, facial synkinesia, dyskinesia    INDICATIONS AND CONSENT  Annamarie Tesfaye presents with facial synkinesis and dyskinesia. The patient was offered the above procedures and after the risks, benefits, alternatives, and limitations to the procedure were discussed, the patient elected to proceed.  Risks discussed included but were not limited to bruising, lack of effect, ptosis, or an undesirable outcome  including contour irregularities.  Informed consent was obtained.     DESCRIPTION OF PROCEDURE:  Using sterile technique, the injections were performed as follows:     DYSPORT was injected into the bilateral orbicularis oculi, bilateral mentalis, bilateral corrugatrs.  A total of 154 units was injected by Dr. Davila.     The precise locations and amounts were recorded on the facial map.  The patient tolerated the procedure well without any complications.    Plan: Continue chemodenervation q 2 months    ------------------------------------  2/5/25 DYSPORT INJECTION  Last DSYPORT INJECTION 12/18/24   Dx: Benign blepharospasm, facial synkinesia, dyskinesia    INDICATIONS AND CONSENT  Annamarie Tesfaye presents with facial synkinesis and dyskinesia. The patient was offered the above procedures and after the risks, benefits, alternatives, and limitations to the procedure were discussed, the patient elected to proceed.  Risks discussed included but were not limited to bruising, lack of effect, ptosis, or an undesirable outcome including contour irregularities.  Informed consent was obtained.     DESCRIPTION OF PROCEDURE:  Using sterile technique, the injections were performed as follows:     DYSPORT was injected into the bilateral orbicularis oculi, bilateral mentalis, bilateral corrugatrs.  A total of 154 units was injected by Dr. Davila.     The precise locations and amounts were recorded on the facial map.  The patient tolerated the procedure well without any complications.    Plan: Continue chemodenervation q 2 months         -----------------------------------  12/18/24 DYSPORT INJECTION  Last DSYPORT INJECTION 10/16/24   Dx: Benign blepharospasm, facial synkinesia, dyskinesia    INDICATIONS AND CONSENT  Annamarie Tesfaye presents with facial synkinesis and dyskinesia. The patient was offered the above procedures and after the risks, benefits, alternatives, and limitations to the procedure were discussed, the patient  elected to proceed.  Risks discussed included but were not limited to bruising, lack of effect, ptosis, or an undesirable outcome including contour irregularities.  Informed consent was obtained.     DESCRIPTION OF PROCEDURE:  Using sterile technique, the injections were performed as follows:     DYSPORT was injected into the bilateral orbicularis oculi, bilateral mentalis, bilateral corrugatrs.  A total of 154 units was injected by Dr. Davila.     The precise locations and amounts were recorded on the facial map.  The patient tolerated the procedure well without any complications.       -------------------------------------------------------------------  10/16/24 DYSPORT INJECTION  Dx: Benign blepharospasm, facial synkinesia, dyskinesia    INDICATIONS AND CONSENT  Annamarie Tesfaye presents with facial synkinesis and dyskinesia. The patient was offered the above procedures and after the risks, benefits, alternatives, and limitations to the procedure were discussed, the patient elected to proceed.  Risks discussed included but were not limited to bruising, lack of effect, ptosis, or an undesirable outcome including contour irregularities.  Informed consent was obtained.     DESCRIPTION OF PROCEDURE:  Using sterile technique, the injections were performed as follows:     DYSPORT was injected into the bilateral orbicularis oculi, bilateral mentalis, bilateral corrugatrs.  A total of 154 units was injected by Dr. Davila.     The precise locations and amounts were recorded on the facial map.  The patient tolerated the procedure well without any complications.          -----------------------------------------------  8/21/24 DYSPORT INJECTION    Dysport injection  INDICATIONS AND CONSENT  Annamarie Tesfaye presents with facial synkinesis and dyskinesia. The patient was offered the above procedures and after the risks, benefits, alternatives, and limitations to the procedure were discussed, the patient elected to proceed.   Risks discussed included but were not limited to bruising, lack of effect, ptosis, or an undesirable outcome including contour irregularities.  Informed consent was obtained.     DESCRIPTION OF PROCEDURE:  Using sterile technique, the injections were performed as follows:     Dysport was injected into the bilateral orbicularis oculi, bilateral zygomaticus, bilateral corrugators, bilateral mentalis.  A total of 158 units was injected.    The precise locations and amounts were recorded on the facial map.  The patient tolerated the procedure well without any complications.         Plan: Continue denervation with DYSPORT in 3 months.  Discussed possible future surgery for lower face to include potential jowel implants, midface lift, neck lift. Patient to think about this and get back to us.     Jennifer Shahid PA-C     ------------------------------------------------  6/19/24  DYSPORT INJECTION    Xeomin trial failed per [facility] 4/30/24    Dysport injection  INDICATIONS AND CONSENT  Annamarie Tesfaye presents with facial synkinesis and dyskinesia. The patient was offered the above procedures and after the risks, benefits, alternatives, and limitations to the procedure were discussed, the patient elected to proceed.  Risks discussed included but were not limited to bruising, lack of effect, ptosis, or an undesirable outcome including contour irregularities.  Informed consent was obtained.     DESCRIPTION OF PROCEDURE:  Using sterile technique, the injections were performed as follows:     Dysport was injected into the bilateral orbicularis oculi, bilateral zygomaticus, bilateral orbicularis oris, bilateral corrugators, bilateral mentalis.  A total of 162 units was injected.    The precise locations and amounts were recorded on the facial map.  The patient tolerated the procedure well without any complications.         -----------------------------------------  Returns for [facility] 4/30/24  Unfortunately limited response to last  injection with xeomin with results fading after 2-3 weeks. We discussed trial of dysport over xeomin in the future. She has already failed to achieve results with botox as well.    Will re-attempt in 4 weeks with dysport instead of xeomin.    This visit was conducted by means of a two-way synchronous audio-only connection, and technical limitations of providing care through this modality, including that audio-only telephone technology may not always be HIPAA-compliant, were explained to the patient. Patient has explicitly consented to this telephone visit. I communicated with the the patient on the phone during this visit, and more than 50% of the time was spent in counseling and coordination of care.    Total time spent on this visit: 15    Dx: Bilateral Blepharospasm, facial spasms  ----------------------------------------------------   4/3/24 XEOMIN  Injection   INDICATIONS AND CONSENT  Annamarie Tesfaye presents with bilateral blepharospasm. The patient was offered the above procedures and after the risks, benefits, alternatives, and limitations to the procedure were discussed, the patient elected to proceed.  Risks discussed included but were not limited to bruising, lack of effect, ptosis, or an undesirable outcome including contour irregularities.  Informed consent was obtained.      DESCRIPTION OF PROCEDURE:  Using sterile technique, the injections were performed as follows:      Xeomin was injected into the bilateral orbicularis oris, bilateral corrugators, bilateral orbicularis oculi, bilateral procerus .  A total of 70 units was injected. 30 units were wasted.      The precise locations and amounts were recorded on the facial map.  The patient tolerated the procedure well without any complications.         -------------------------------------------------------     2/5/24: XEOMIN injection   Annamarie Tesfaye presents with benign blepharospasm. The patient was offered the above procedures and after the risks,  benefits, alternatives, and limitations to the procedure were discussed, the patient elected to proceed.  Risks discussed included but were not limited to bruising, lack of effect, ptosis, or an undesirable outcome including contour irregularities.  Informed consent was obtained.      DESCRIPTION OF PROCEDURE:  Using sterile technique, the injections were performed as follows:      Xeomin was injected into the bl orbicularis oculi and corrugators..  A total of 56 units was injected. 44 units wasted     The precise locations and amounts were recorded on the facial map.  The patient tolerated the procedure well without any complications.       ----------------------------------------------------------------------     10/5/23: Xeomin was injected into the bl orbicularis oculi and corrugators.  A total of 56 units was injected. 44 units was wasted.

## 2025-06-04 ENCOUNTER — APPOINTMENT (OUTPATIENT)
Dept: OTOLARYNGOLOGY | Facility: CLINIC | Age: 70
End: 2025-06-04
Payer: MEDICARE

## 2025-06-04 VITALS — BODY MASS INDEX: 17.43 KG/M2 | WEIGHT: 108 LBS

## 2025-06-04 DIAGNOSIS — G24.5 BLEPHAROSPASM: ICD-10-CM

## 2025-06-04 PROCEDURE — 64612 DESTROY NERVE FACE MUSCLE: CPT | Performed by: OTOLARYNGOLOGY

## 2025-06-04 RX ORDER — BOTULINUM TOXIN TYPE A 300 U/1
154 INJECTION, POWDER, LYOPHILIZED, FOR SOLUTION INTRAMUSCULAR ONCE
Status: COMPLETED | OUTPATIENT
Start: 2025-06-04 | End: 2025-06-04

## 2025-06-04 RX ADMIN — BOTULINUM TOXIN TYPE A 154 UNITS: 300 INJECTION, POWDER, LYOPHILIZED, FOR SOLUTION INTRAMUSCULAR at 09:25

## 2025-06-06 DIAGNOSIS — E78.2 MIXED HYPERLIPIDEMIA: ICD-10-CM

## 2025-06-09 RX ORDER — LOVASTATIN 20 MG/1
20 TABLET ORAL NIGHTLY
Qty: 90 TABLET | Refills: 3 | Status: SHIPPED | OUTPATIENT
Start: 2025-06-09

## 2025-06-10 ENCOUNTER — PATIENT MESSAGE (OUTPATIENT)
Dept: OTOLARYNGOLOGY | Facility: CLINIC | Age: 70
End: 2025-06-10
Payer: MEDICARE

## 2025-06-10 DIAGNOSIS — F98.8 ATTENTION DEFICIT DISORDER OF ADULT: ICD-10-CM

## 2025-06-10 RX ORDER — DEXTROAMPHETAMINE SACCHARATE, AMPHETAMINE ASPARTATE, DEXTROAMPHETAMINE SULFATE AND AMPHETAMINE SULFATE 2.5; 2.5; 2.5; 2.5 MG/1; MG/1; MG/1; MG/1
10 TABLET ORAL 2 TIMES DAILY
Qty: 14 TABLET | Refills: 0 | Status: SHIPPED | OUTPATIENT
Start: 2025-06-12 | End: 2025-06-19

## 2025-06-17 ENCOUNTER — TELEMEDICINE (OUTPATIENT)
Dept: BEHAVIORAL HEALTH | Facility: CLINIC | Age: 70
End: 2025-06-17
Payer: MEDICARE

## 2025-06-17 DIAGNOSIS — F98.8 ATTENTION DEFICIT DISORDER OF ADULT: ICD-10-CM

## 2025-06-17 DIAGNOSIS — F32.A ANXIETY AND DEPRESSION: ICD-10-CM

## 2025-06-17 DIAGNOSIS — F41.9 ANXIETY AND DEPRESSION: ICD-10-CM

## 2025-06-17 PROCEDURE — 99214 OFFICE O/P EST MOD 30 MIN: CPT | Performed by: PSYCHIATRY & NEUROLOGY

## 2025-06-17 RX ORDER — GABAPENTIN 100 MG/1
100 CAPSULE ORAL EVERY EVENING
Qty: 30 CAPSULE | Refills: 1 | Status: SHIPPED | OUTPATIENT
Start: 2025-06-17 | End: 2025-08-16

## 2025-06-17 RX ORDER — DEXTROAMPHETAMINE SACCHARATE, AMPHETAMINE ASPARTATE, DEXTROAMPHETAMINE SULFATE AND AMPHETAMINE SULFATE 2.5; 2.5; 2.5; 2.5 MG/1; MG/1; MG/1; MG/1
10 TABLET ORAL 2 TIMES DAILY
Qty: 60 TABLET | Refills: 0 | Status: SHIPPED | OUTPATIENT
Start: 2025-06-17 | End: 2025-07-17

## 2025-06-17 ASSESSMENT — ENCOUNTER SYMPTOMS: NERVOUS/ANXIOUS: 1

## 2025-06-17 NOTE — PROGRESS NOTES
Subjective   Patient ID: Tyler Tesfaye is a 69 y.o. female who presents for No chief complaint on file. I am having more angst at work with more anxiety.    Virtual Consent: The patient engaged in a telehealth session via Epic audio visual or phone with this provider practicing within the [facility]. The identity of the patient was verified by their date of birth and last four digits of their social security number. The provider demonstrated that confidentially was preserved at their location. The patient was informed that they were responsible for ensuring confidentially was secured at their location. The patient's location was documented for emergency purposes. The patient was informed of the necessary steps that would occur if an emergency was to occur or technology failed during session.   An interactive audio and video telecommunication system which permits real time communications between the patient (at the patient's car) and provider (at the home office) was utilized to provide this telehealth service.   Verbal consent was requested and obtained from Tyler Tesfaye on this date, 06/17/25 for a telehealth visit and the patient's location was confirmed at the time of the visit.     Adult Risk Screening     Spiritual and Cultural: Patient Declined.   Initial Fall Risk Screening:   TYLER has not fallen in the last 6 months.~Her fall did not result in injury.~TYLER does not have a fear of falling.~She does not need assistance with sitting, standing or walking.~Does not need assistance walking in her home.~She does not need assistance in an unfamiliar setting.~The patient is not using an assistive device.       Healthcare POA: Health care proxy on file.~Did not bring.   Declaration of Mental Health Treatment: No mental health treatment on file.    Tobacco Screening: TYLER does not use tobacco.~Has not used tobacco in the past 6 months.~Has not tried to quit or thought about quitting tobacco.   Domestic Violence  Screen: Does not feel threatened or abused physically, emotionally or sexually. Do you feel UNSAFE?   The patient feels safe in the home.   Depression/Suicide Screening:   During the past 2 weeks, the patient has not felt down, depressed or hopeless.~   During the past 2 weeks, the patient has not felt little interest or pleasure in doing things.~   She does not have a risk of suicide.~   She has not had thoughts of harming others.    Single alcohol screening question:   In the past year the patient has had 5 or more drinks (men) or 4 or more drinks (women)? 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   Single substance abuse screening question:   In the past year the patient has used a recreational drug or used a prescription drug for non-medical reasons? None. time(s).   Procedure or Sedation Areas:   patient has not had alcohol, recreational drugs, or prescription drugs for non-medical reasons this morning.   Nutrition Screening:   In the past month, there was not a day when I or anyone in my family went hungry because there was not enough food.   Patient Education: The patient denies that they or the person with them has problems with hearing, speaking, seeing, moving around or learning   The patient is comfortable filling out medical forms.   Social Determinant of Health   Does the patient have an SDoH need as identified by the screening form? No.~   Does the patient want intervention? No.   Food Insecurity:   1. Within the past 12 months, you worried that your food would run out before you got money to buy more: No   2. Within the past 12 months, the food you bought just didn't last and you didn't have money to get more: No      History of Present Illness  Ms. TYLER LAMB denies any suicidal or homicidal ideation or plans.  The patient reports depressed and anxious features are improved.   The patient reports doing well at the present time.         I have personally reviewed the OARRS  report for TYLER KINGLIVAN. I have considered the risks of abuse, dependence, addiction and diversion.   I have the following concerns: No irregularities evidenced.   Is the patient prescribed a combination of a benzodiazepine and opioid? No.   Last urine drug screening date/ordered today: 8/8/23   Controlled Substance Agreement:   I have printed this form and reviewed each line item with the patient and the patient has verbalized understanding.   Date of the last Controlled Substance Agreement: 8/8/23      Review of Systems     Depressive Symptoms: not depressed or irritable,~no loss of interest,~no change in appetite,~no recent lb weight gain,~no recent lb weight loss,~no insomnia,~no fatigue or loss of energy,~not feeling worthless or guilty,~normal concentration,~ability to make decisions,~no suicidal ideation,~no guns or weapons in household.   Manic Symptoms: mood is not irritable or elevated,~self esteem is not grandiose or increased,~no changes in need for sleep,~not more talkative than usual,~does not have flight of ideas or racing thoughts,~no distractibility,~no psychomotor agitation or increased goal-directed activity,~no excessive involvement in pleasurable activities.   Psychotic Symptoms: no hallucinations,~no delusions,~no disorganized speech,~does not have disorganized behavior or catatonia,~no negative symptoms.   Anxiety Symptoms: excessive worry, but~no panic attacks,~no concerns about future panic attacks,~no worry about panic attack consequences,~no change in behavior due to panic attacks,~no difficulty controlling worry,~no increase in arousal,~not easily fatigued due to worry,~no difficulty concentrating due to worry,~no irritability due to worry,~no muscle tension due to worry,~no sleep disturbances due to worry,~no specific phobia,~no social phobia,~no obsessions,~no compulsions,~no exposure to traumatic event,~no re-experiencing of traumatic event,~no avoidance of stimuli and number of  responsiveness,~no restlessness / feeling on edge due to worry.   Delirium/ Altered Mental Status Symptoms: no disturbances of consciousness,~no diminished ability to focus, sustain, shift attention,~no change in cognition or perceptual disturbances,~symptoms do not fluctuate during the course of the day,~general medical condition is not present.   Post-traumatic stress disorder symptoms no flashbacks,~no intrusive thoughts,~no avoiding triggers,~no emotional numbing,~not feeling detached,~no disinterest in life,~no relationship problems,~no hopelessness,~no fearfulness,~no startles easily,~no irritability,~no agitation,~no inability to concentrate,~no hypervigilance,~no sleep disturbance,~no nightmares.   Inattentive Symptoms: is not often forgetful.   Other Symptoms/ Concerns: no symptoms of separation anxiety,~no reactive attachment symptoms,~no motor tics,~no vocal tics,~no stuttering,~no phonological problems,~no loss of urine control,~no encopresis,~no intellectual disability,~no self-injurious behaviors,~not somatic and no conversion symptoms,~no gender identity symptoms,~no sleep disorder symptoms,~no impulse control symptoms,~no personality disorder symptoms.   All other systems have been reviewed and are negative for complaint.      Constitutional: no sleep apnea,~normal sleeping,~no night wakings,~no snoring,~not a picky eater,~normal appetite,~no swallowing problems,~no night terrors,~no nightmares,~no restless sleep,~no snorts/gasps~and~no obesity.   ENT: no hearing tested~and~no hearing loss.   Cardiovascular: no murmur,~no heart defect~and~no chest pain.   Respiratory: no wheezing.   Gastrointestinal: no constipation,~no abdominal pain,~no nausea,~no vomiting~and~no diarrhea.   Genitourinary: no nocturnal enuresis~and~no diurnal enuresis.   Musculoskeletal: moving all extremities well and symmetrical.   Integumentary: no changes in moles or birthmarks~and~no rashes.   Neurological: symmetrical facies,  but~no headache,~no head injury,~no seizures,~no staring spells,~no loss of consciousness,~no meningitis/encephalitis,~no cerebral palsy,~no spina bifida,~no stereotypy,~no developmental regression~and~no tics or twitches.   All other systems have been reviewed and are negative for complaint.      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)   · Depression with anxiety (300.4) (F41.8)   · Dysuria (788.1) (R30.0)   · Encounter for immunization (V03.89) (Z23)   · Encounter to establish care (V65.8) (Z76.89)   · HLD (hyperlipidemia) (272.4) (E78.5)   · HTN (hypertension) (401.9) (I10)   · Hydronephrosis (591) (N13.30)   · Incipient senile cataract (366.12) (H25.099)   · Medication management (V58.69) (Z79.899)   · Nephrolithiasis (592.0) (N20.0)   · Nervously anxious (799.21) (R45.0)   · Palpitations (785.1) (R00.2)   · Renal mass (593.9) (N28.89)   · Screening mammography declined (V64.2) (Z53.20)   · Stage 3a chronic kidney disease (585.3) (N18.31)   · Stage 3a chronic kidney disease   · Visit for screening mammogram (V76.12) (Z12.31)     Past Medical History     Past Psychiatric History   Inpatient history: None   Suicide attempts/Self-Harm/Ideation History: None   Current providers: None      Surgical History  Problems    · History of Ptosis repair     Family History  Mother    · Family history of Alcohol use   · Family history of cataracts (V19.19) (Z83.518)   · Family history of hypertension (V17.49) (Z82.49)   · Family history of senile dementia (V17.2) (Z81.8)  Father    · Family history of Alcohol abuse   · Family history of hypertension (V17.49) (Z82.49)   · Family history of myocardial infarction  (V17.3) (Z82.49)   · Fatal MI age 55 years  Son    · Family history of Alcohol abuse  Sister    · Family history of Alcohol abuse   · Family history of hypertension (V17.49) (Z82.49)  Brother    · Family history of CABG (V17.49) (Z82.49)   · CABG age 73 years   · Family history of hypertension (V17.49) (Z82.49)     Social History  Problems    · Activities: Bicycling   · Activities: Hiking   · Born in [place]   · Completed college, bachelors degree   · Obtained a communication degree.   · Completed elementary school   · Completed high school   · Daily caffeine consumption   · Does not smoke (V49.89) (Z78.9)   · Does not use illicit drugs (V49.89) (Z78.9)   · Employed   · Employed for 35 years in [employer].   · Exercise: Walking   · Exercises regularly   · Never smoked cigarettes (V49.89) (Z78.9)   · No alcohol use     Allergies  Medication    · No Known Drug Allergies   Recorded By: Annemarie Severino; 3/11/2021 2:49:54 PM     Current Meds     Medication NameInstruction  amLODIPine Besylate 10 MG Oral TabletTAKE 1 TABLET BY MOUTH DAILY  Amphetamine-Dextroamphetamine 10 MG Oral TabletOne tablet every morning.  hydrOXYzine HCl - 25 MG Oral Tablet               Lovastatin 20 MG Oral TabletTAKE 1 TABLET DAILY at bedtime  Sertraline HCl - 100 MG Oral TabletTake one tablet by mouth every day with food.  Spironolactone 50 MG Oral TabletTAKE 1 TABLET BY MOUTH DAILY     Tobacco Useb) No  PHQ-2  #1. Over the last 2 weeks have you felt down, depressed or hopeless?  (If yes, answer PHQ-9 below)            No  PHQ-2  #2. Over the last 2 weeks have you felt little interest  or pleasure in doing things?  (If yes, answer PHQ-9 below)            No  Falls Screening (Age 18+)a) No falls within the last year  O2 [id number]  Pain Scale0     Mental Status Exam     General: Appropriately groomed and dressed~In no acute distress.   Appearance: Depressed and anxious features are improved.   Attitude: Cooperative.   Behavior: Depressed and anxious  features are improved.   Motor Activity: No agitation or retardation. No EPS/TD. Normal gait and station.   Speech: Regular rate, rhythm, volume and tone, spontaneous, fluent.   Mood: Depressed and anxious features are improved.   Affect: Depressed and anxious features are improved.   Thought Process: Depressed and anxious features are improved.   Thought Content: Depressed and anxious features are improved.   Thought Perception: Does not endorse auditory or visual hallucinations, does not appear to be responding to hallucinatory stimuli.   Cognition: Depressed and anxious features are improved.   Insight: Insight is limited.   Judgment: Judgment is limited.       Appearance: well-groomed.   Build: average.   Demeanor: average.   Eye Contact: average.   Motor Activity: average.   Speech: clear.   Language: Neurologic language is intact.   Fund of Knowledge: aware of current events,~fair fund of knowledge.   Delusions: None Reported.   Self Harm: None Reported.   Aggressive: None Reported.   Mood: Depressed and anxious in substantial.   Affect: Euthymic   Orientation: alert,~oriented x3.   Manner: cooperative.   Thought process: goal-directed.   Thought association: displays rational thought process.   Content of thought: Ms. TYLER LAMB denies any suicidal or homicidal ideation or plans.   Abstract/ Rational Thought: intact   Memory: grossly intact.   Behavior:. Depressed and anxious in substantial.   Attention/Concentration:. Doing well.   Cognition: intact.   Intelligence Estimate: average.   Executive Function: intact.   Insight:. Depressed and anxious in substantial.   Judgement:. Depressed and anxious in substantial.      MSE: The patient is alert, fully oriented, language is intact, and recent and remote memory intact. The patient denies any suicidal or homicidal ideation or plans. The patient presents with stress related anxiety without depressive, manic or psychotic symptoms. Thought is logical and clear.  No disturbances of judgment or insight are exhibited. No behavioral disturbances are present on examination.           Review of Systems   Neurological:         Mental Status Exam     General: Appropriately groomed and dressed~In no acute distress.   Appearance: Depressed and anxious features are improved.   Attitude: Cooperative.   Behavior: Depressed and anxious features are improved.   Motor Activity: No agitation or retardation. No EPS/TD. Normal gait and station.   Speech: Regular rate, rhythm, volume and tone, spontaneous, fluent.   Mood: Depressed and anxious features are improved.   Affect: Depressed and anxious features are improved.   Thought Process: Depressed and anxious features are improved.   Thought Content: Depressed and anxious features are improved.   Thought Perception: Does not endorse auditory or visual hallucinations, does not appear to be responding to hallucinatory stimuli.   Cognition: Depressed and anxious features are improved.   Insight: Insight is limited.   Judgment: Judgment is limited.       Appearance: well-groomed.   Build: average.   Demeanor: average.   Eye Contact: average.   Motor Activity: average.   Speech: clear.   Language: Neurologic language is intact.   Fund of Knowledge: aware of current events,~fair fund of knowledge.   Delusions: None Reported.   Self Harm: None Reported.   Aggressive: None Reported.   Mood: Depressed and anxious in substantial.   Affect: Euthymic   Orientation: alert,~oriented x3.   Manner: cooperative.   Thought process: goal-directed.   Thought association: displays rational thought process.   Content of thought: Ms. TYLER LAMB denies any suicidal or homicidal ideation or plans.   Abstract/ Rational Thought: intact   Memory: grossly intact.   Behavior:. Depressed and anxious in substantial.   Attention/Concentration:. Doing well.   Cognition: intact.   Intelligence Estimate: average.   Executive Function: intact.   Insight:. Depressed and  anxious in substantial.   Judgement:. Depressed and anxious in substantial.      MSE: The patient is alert, fully oriented, language is intact, and recent and remote memory intact. The patient denies any suicidal or homicidal ideation or plans. The patient presents with stress related anxiety without depressive, manic or psychotic symptoms. Thought is logical and clear. No disturbances of judgment or insight are exhibited. No behavioral disturbances are present on examination.     Psychiatric/Behavioral:  The patient is nervous/anxious.         Mental Status Exam     General: Appropriately groomed and dressed~In no acute distress.   Appearance: Depressed and anxious features are improved.   Attitude: Cooperative.   Behavior: Depressed and anxious features are improved.   Motor Activity: No agitation or retardation. No EPS/TD. Normal gait and station.   Speech: Regular rate, rhythm, volume and tone, spontaneous, fluent.   Mood: Depressed and anxious features are improved.   Affect: Depressed and anxious features are improved.   Thought Process: Depressed and anxious features are improved.   Thought Content: Depressed and anxious features are improved.   Thought Perception: Does not endorse auditory or visual hallucinations, does not appear to be responding to hallucinatory stimuli.   Cognition: Depressed and anxious features are improved.   Insight: Insight is limited.   Judgment: Judgment is limited.       Appearance: well-groomed.   Build: average.   Demeanor: average.   Eye Contact: average.   Motor Activity: average.   Speech: clear.   Language: Neurologic language is intact.   Fund of Knowledge: aware of current events,~fair fund of knowledge.   Delusions: None Reported.   Self Harm: None Reported.   Aggressive: None Reported.   Mood: Depressed and anxious in substantial.   Affect: Euthymic   Orientation: alert,~oriented x3.   Manner: cooperative.   Thought process: goal-directed.   Thought association: displays  rational thought process.   Content of thought: Ms. TYLER LAMB denies any suicidal or homicidal ideation or plans.   Abstract/ Rational Thought: intact   Memory: grossly intact.   Behavior:. Depressed and anxious in substantial.   Attention/Concentration:. Doing well.   Cognition: intact.   Intelligence Estimate: average.   Executive Function: intact.   Insight:. Depressed and anxious in substantial.   Judgement:. Depressed and anxious in substantial.      MSE: The patient is alert, fully oriented, language is intact, and recent and remote memory intact. The patient denies any suicidal or homicidal ideation or plans. The patient presents with stress related anxiety without depressive, manic or psychotic symptoms. Thought is logical and clear. No disturbances of judgment or insight are exhibited. No behavioral disturbances are present on examination.     All other systems reviewed and are negative.    Psych Review of Symptoms:    ADHD: Patient denied any symptoms.         Anxiety:   Generalized Anxiety Symptoms: Difficulty controlling worry.       Developmental and Sensory Concerns: Patient denied any symptoms.         Depressive Symptoms: Patient denied any symptoms.         Disruptive and Conduct Symptoms: Patient denied any symptoms.         Eating / Feeding Concerns: Patient denied any symptoms.         Elimination Symptoms: Patient denied any symptoms.         Manic Symptoms: Patient denied any symptoms.         Obsessive-Compulsive Symptoms: Patient denied any symptoms.         Psychotic Symptoms: Patient denied any symptoms.           Trauma Related Symptoms: Patient denied any symptoms.           Sleep Concerns: Patient denied any symptoms.             Objective   Physical Exam  Neurological:      Mental Status: She is alert and oriented to person, place, and time.      Comments: Mental Status Exam     General: Appropriately groomed and dressed~In no acute distress.   Appearance: Depressed and anxious  features are improved.   Attitude: Cooperative.   Behavior: Depressed and anxious features are improved.   Motor Activity: No agitation or retardation. No EPS/TD. Normal gait and station.   Speech: Regular rate, rhythm, volume and tone, spontaneous, fluent.   Mood: Depressed and anxious features are improved.   Affect: Depressed and anxious features are improved.   Thought Process: Depressed and anxious features are improved.   Thought Content: Depressed and anxious features are improved.   Thought Perception: Does not endorse auditory or visual hallucinations, does not appear to be responding to hallucinatory stimuli.   Cognition: Depressed and anxious features are improved.   Insight: Insight is limited.   Judgment: Judgment is limited.       Appearance: well-groomed.   Build: average.   Demeanor: average.   Eye Contact: average.   Motor Activity: average.   Speech: clear.   Language: Neurologic language is intact.   Fund of Knowledge: aware of current events,~fair fund of knowledge.   Delusions: None Reported.   Self Harm: None Reported.   Aggressive: None Reported.   Mood: Depressed and anxious in substantial.   Affect: Euthymic   Orientation: alert,~oriented x3.   Manner: cooperative.   Thought process: goal-directed.   Thought association: displays rational thought process.   Content of thought: Ms. TYLER LAMB denies any suicidal or homicidal ideation or plans.   Abstract/ Rational Thought: intact   Memory: grossly intact.   Behavior:. Depressed and anxious in substantial.   Attention/Concentration:. Doing well.   Cognition: intact.   Intelligence Estimate: average.   Executive Function: intact.   Insight:. Depressed and anxious in substantial.   Judgement:. Depressed and anxious in substantial.      MSE: The patient is alert, fully oriented, language is intact, and recent and remote memory intact. The patient denies any suicidal or homicidal ideation or plans. The patient presents with stress related  anxiety without depressive, manic or psychotic symptoms. Thought is logical and clear. No disturbances of judgment or insight are exhibited. No behavioral disturbances are present on examination.     Psychiatric:      Comments: Mental Status Exam     General: Appropriately groomed and dressed~In no acute distress.   Appearance: Depressed and anxious features are improved.   Attitude: Cooperative.   Behavior: Depressed and anxious features are improved.   Motor Activity: No agitation or retardation. No EPS/TD. Normal gait and station.   Speech: Regular rate, rhythm, volume and tone, spontaneous, fluent.   Mood: Depressed and anxious features are improved.   Affect: Depressed and anxious features are improved.   Thought Process: Depressed and anxious features are improved.   Thought Content: Depressed and anxious features are improved.   Thought Perception: Does not endorse auditory or visual hallucinations, does not appear to be responding to hallucinatory stimuli.   Cognition: Depressed and anxious features are improved.   Insight: Insight is limited.   Judgment: Judgment is limited.       Appearance: well-groomed.   Build: average.   Demeanor: average.   Eye Contact: average.   Motor Activity: average.   Speech: clear.   Language: Neurologic language is intact.   Fund of Knowledge: aware of current events,~fair fund of knowledge.   Delusions: None Reported.   Self Harm: None Reported.   Aggressive: None Reported.   Mood: Depressed and anxious in substantial.   Affect: Euthymic   Orientation: alert,~oriented x3.   Manner: cooperative.   Thought process: goal-directed.   Thought association: displays rational thought process.   Content of thought: Ms. TYLER LAMB denies any suicidal or homicidal ideation or plans.   Abstract/ Rational Thought: intact   Memory: grossly intact.   Behavior:. Depressed and anxious in substantial.   Attention/Concentration:. Doing well.   Cognition: intact.   Intelligence Estimate:  average.   Executive Function: intact.   Insight:. Depressed and anxious in substantial.   Judgement:. Depressed and anxious in substantial.      MSE: The patient is alert, fully oriented, language is intact, and recent and remote memory intact. The patient denies any suicidal or homicidal ideation or plans. The patient presents with stress related anxiety without depressive, manic or psychotic symptoms. Thought is logical and clear. No disturbances of judgment or insight are exhibited. No behavioral disturbances are present on examination.           Lab Review:   Orders Only on 04/17/2025   Component Date Value    URIC ACID 04/17/2025 5.9     GLUCOSE 04/17/2025 95     UREA NITROGEN (BUN) 04/17/2025 20     CREATININE 04/17/2025 1.14 (H)     EGFR 04/17/2025 52 (L)     BUN/CREATININE RATIO 04/17/2025 18     SODIUM 04/17/2025 138     POTASSIUM 04/17/2025 4.9     CHLORIDE 04/17/2025 101     CARBON DIOXIDE 04/17/2025 24     CALCIUM 04/17/2025 10.4     PHOSPHATE (AS PHOSPHORUS) 04/17/2025 3.6     ALBUMIN 04/17/2025 5.1     PARATHYROID HORMONE, INT* 04/17/2025 27        Assessment/Plan   Psychiatric Risk Assessment  Violence Risk Assessment: none  Acute Risk of Harm to Others is Considered: low   Suicide Risk Assessment: age > 65 yrs old and   Protective Factors against Suicide: adherence to  treatment, fear of suicide, moral objections to suicide, positive family relationships, and sense of responsibility toward family  Acute Risk of Harm to Self is Considered: low    Imminent Risk of Suicide or Serious Self-Injury: Low   Chronic Risk of Suicide of Serious Self-Injury: Low  Risk factors: Age, depression history and   Protective factors: Denies current suicidal ideation, denies history of suicide attempts , willingness to seek help and support , gender, access to a variety of clinical interventions , and receiving and engaged in care for mental, physical, and substance use disorders      Imminent Risk of Violence  or Homicide: Low   Risk Factors: No significant risk factors identified on screening  Protective Factors: Lack of known history of harm to others , Lack of known history of violent ideation , and lack of known access to firearms.     Assessment & Plan  Anxiety and depression  Diagnoses/Problems  Assessed    · Depression with anxiety (300.4) (F41.8)   · Attention deficit disorder of adult (314.00) (F98.8)   · Anxious mood (300.00) (F41.9)   · Medication management (V58.69) (Z79.899)     Orders  Anxious mood, Depression with anxiety    · Renew: Sertraline HCl - 100 MG Oral Tablet; Take one tablet by mouth every day with food  Medication management    · Amphetamine Confirm, Urine; Status:Active; Requested for:[id number];      Patient Discussion/Summary     Your diagnosis is depression with anxiety improved but you report life long attention disturbance probable ADD from childhood untreated and are seeking treatment.     Please follow up in person at [facility] and at the [facility] () in person  in October of 2025  and sooner virtually if needed as discussed today. For all urgent or emergency matters please call 911, go to urgent care or the emergency department of the nearest hospital.     [place] was reviewed today with no concerning findings evidenced.     You completed your urine amphetamine drug screen on October 2, 2024.     We have stopped sertraline 100 mg daily with food. You clearly are able to recite back the risks, benefits and alternatives of these medications including the serotonin syndrome when combining sertraline with Adderall as discussed with you today.      The FDA risks of antidepressants including increased risk of suicide, cardiotoxicity, weight gain, lowered seizure threshold, the serotonin syndrome with serotonin agents, individually or with multiple serotonin agent interaction and anticholinergic effects have been discussed. We also discussed that at least in the case of  SSRIs there is interference with warfarin and nonsteroidal inflammatories and can cause increased bleeding and hemorrhage.     Continue amphetamine- dextroamphetamine 10 mg in the morning and at noon daily for now. We are starting gabapentin 100 mg every evening secondary to the Adderall SSRI & serotonin agents risk of the serotonin syndrome and increased amphetamine levels from sertraline and buspirone as noted in Micromedex as discussed.  Orders:    gabapentin (Neurontin) 100 mg capsule; Take 1 capsule (100 mg) by mouth once daily in the evening.    Attention deficit disorder of adult  Your diagnosis is depression with anxiety improved but you report life long attention disturbance probable ADD from childhood untreated and are seeking treatment.     Please follow up in person at [facility] and at the [facility] () in person  in October of 2025  and sooner virtually if needed as discussed today. For all urgent or emergency matters please call 911, go to urgent care or the emergency department of the nearest hospital.     OARRS was reviewed today with no concerning findings evidenced.     You completed your urine amphetamine drug screen on October 2, 2024.     We have stopped sertraline 100 mg daily with food. You clearly are able to recite back the risks, benefits and alternatives of these medications including the serotonin syndrome when combining sertraline with Adderall as discussed with you today.      The FDA risks of antidepressants including increased risk of suicide, cardiotoxicity, weight gain, lowered seizure threshold, the serotonin syndrome with serotonin agents, individually or with multiple serotonin agent interaction and anticholinergic effects have been discussed. We also discussed that at least in the case of SSRIs there is interference with warfarin and nonsteroidal inflammatories and can cause increased bleeding and hemorrhage.     Continue amphetamine- dextroamphetamine  10 mg in the morning and at noon daily for now. We are starting gabapentin 100 mg every evening secondary to the Adderall SSRI & serotonin agents risk of the serotonin syndrome and increased amphetamine levels from sertraline and buspirone as noted in Micromedex as discussed.  Orders:    amphetamine-dextroamphetamine (Adderall) 10 mg tablet; Take 1 tablet (10 mg) by mouth 2 times a day.    Time:   Prep time on date of the patient encounter: 5 minutes.   Time spent directly with patient/family/caregiver: 20 minutes.   Additional time spent on patient care activities:  minutes.   Documentation time: 5 minutes.   Total time on date of patient encounter: 30 minutes.

## 2025-06-17 NOTE — ASSESSMENT & PLAN NOTE
Your diagnosis is depression with anxiety improved but you report life long attention disturbance probable ADD from childhood untreated and are seeking treatment.     Please follow up in person at [facility] and at the [facility] () in person  in October of 2025  and sooner virtually if needed as discussed today. For all urgent or emergency matters please call 911, go to urgent care or the emergency department of the nearest hospital.     OARRS was reviewed today with no concerning findings evidenced.     You completed your urine amphetamine drug screen on October 2, 2024.     We have stopped sertraline 100 mg daily with food. You clearly are able to recite back the risks, benefits and alternatives of these medications including the serotonin syndrome when combining sertraline with Adderall as discussed with you today.      The FDA risks of antidepressants including increased risk of suicide, cardiotoxicity, weight gain, lowered seizure threshold, the serotonin syndrome with serotonin agents, individually or with multiple serotonin agent interaction and anticholinergic effects have been discussed. We also discussed that at least in the case of SSRIs there is interference with warfarin and nonsteroidal inflammatories and can cause increased bleeding and hemorrhage.     Continue amphetamine- dextroamphetamine 10 mg in the morning and at noon daily for now. We are starting gabapentin 100 mg every evening secondary to the Adderall SSRI & serotonin agents risk of the serotonin syndrome and increased amphetamine levels from sertraline and buspirone as noted in Micromedex as discussed.  Orders:    amphetamine-dextroamphetamine (Adderall) 10 mg tablet; Take 1 tablet (10 mg) by mouth 2 times a day.

## 2025-06-20 DIAGNOSIS — G24.5 BLEPHAROSPASM: ICD-10-CM

## 2025-06-20 RX ORDER — OXYMETAZOLINE HYDROCHLORIDE OPHTHALMIC 1 MG/ML
1 SOLUTION/ DROPS OPHTHALMIC DAILY
Qty: 30 EACH | Refills: 0 | Status: SHIPPED | OUTPATIENT
Start: 2025-06-20 | End: 2025-07-20

## 2025-07-11 DIAGNOSIS — F98.8 ATTENTION DEFICIT DISORDER OF ADULT: ICD-10-CM

## 2025-07-11 RX ORDER — DEXTROAMPHETAMINE SACCHARATE, AMPHETAMINE ASPARTATE, DEXTROAMPHETAMINE SULFATE AND AMPHETAMINE SULFATE 2.5; 2.5; 2.5; 2.5 MG/1; MG/1; MG/1; MG/1
10 TABLET ORAL 2 TIMES DAILY
Qty: 60 TABLET | Refills: 0 | Status: SHIPPED | OUTPATIENT
Start: 2025-07-17 | End: 2025-08-16

## 2025-07-16 ENCOUNTER — APPOINTMENT (OUTPATIENT)
Dept: OTOLARYNGOLOGY | Facility: CLINIC | Age: 70
End: 2025-07-16
Payer: MEDICARE

## 2025-07-23 DIAGNOSIS — Z12.31 ENCOUNTER FOR SCREENING MAMMOGRAM FOR BREAST CANCER: ICD-10-CM

## 2025-07-31 ENCOUNTER — APPOINTMENT (OUTPATIENT)
Dept: RADIOLOGY | Facility: CLINIC | Age: 70
End: 2025-07-31
Payer: MEDICARE

## 2025-07-31 VITALS — BODY MASS INDEX: 17.36 KG/M2 | WEIGHT: 108 LBS | HEIGHT: 66 IN

## 2025-07-31 DIAGNOSIS — Z12.31 ENCOUNTER FOR SCREENING MAMMOGRAM FOR BREAST CANCER: ICD-10-CM

## 2025-07-31 PROCEDURE — 77067 SCR MAMMO BI INCL CAD: CPT

## 2025-08-06 ENCOUNTER — HOSPITAL ENCOUNTER (OUTPATIENT)
Dept: RADIOLOGY | Facility: EXTERNAL LOCATION | Age: 70
Discharge: HOME | End: 2025-08-06

## 2025-08-06 ENCOUNTER — APPOINTMENT (OUTPATIENT)
Dept: OTOLARYNGOLOGY | Facility: CLINIC | Age: 70
End: 2025-08-06
Payer: MEDICARE

## 2025-08-06 VITALS — BODY MASS INDEX: 17.44 KG/M2 | WEIGHT: 108 LBS

## 2025-08-06 DIAGNOSIS — Q67.0 FACIAL ASYMMETRIES: ICD-10-CM

## 2025-08-06 DIAGNOSIS — G24.5 BENIGN ESSENTIAL BLEPHAROSPASM: ICD-10-CM

## 2025-08-06 DIAGNOSIS — G24.5 BLEPHAROSPASM: ICD-10-CM

## 2025-08-06 PROCEDURE — 64612 DESTROY NERVE FACE MUSCLE: CPT | Performed by: OTOLARYNGOLOGY

## 2025-08-06 RX ORDER — BOTULINUM TOXIN TYPE A 300 U/1
154 INJECTION, POWDER, LYOPHILIZED, FOR SOLUTION INTRAMUSCULAR ONCE
Status: COMPLETED | OUTPATIENT
Start: 2025-08-06 | End: 2025-08-06

## 2025-08-06 RX ADMIN — BOTULINUM TOXIN TYPE A 154 UNITS: 300 INJECTION, POWDER, LYOPHILIZED, FOR SOLUTION INTRAMUSCULAR at 09:16

## 2025-08-07 NOTE — PROGRESS NOTES
Facial Plastic & Reconstructive Surgery    8.6.25    INDICATIONS AND CONSENT  Annamarie Tesfaye presents with facial synkinesis and dyskinesia. The patient was offered the above procedures and after the risks, benefits, alternatives, and limitations to the procedure were discussed, the patient elected to proceed.  Risks discussed included but were not limited to bruising, lack of effect, ptosis, or an undesirable outcome including contour irregularities.  Informed consent was obtained.     DESCRIPTION OF PROCEDURE:  Using sterile technique, the injections were performed as follows:     DYSPORT was injected into the bilateral orbicularis oculi, bilateral mentalis, bilateral corrugatrs.  A total of 154 units was injected by Dr. Davila.     The precise locations and amounts were recorded on the facial map.  The patient tolerated the procedure well without any complications.    The patient continues to experience significant return of symptoms, including facial spasm and blepharospasm, within one month of chemodenervation. This is a shorter duration of effect than typically expected. Given the early recurrence of symptoms and associated functional impairment, the patient would benefit from more frequent treatments, approximately every 4 weeks, to maintain symptom control.        Plan: Continue chemodenervation q 1 months    ---------------------

## 2025-08-12 DIAGNOSIS — F98.8 ATTENTION DEFICIT DISORDER OF ADULT: ICD-10-CM

## 2025-08-12 RX ORDER — DEXTROAMPHETAMINE SACCHARATE, AMPHETAMINE ASPARTATE, DEXTROAMPHETAMINE SULFATE AND AMPHETAMINE SULFATE 2.5; 2.5; 2.5; 2.5 MG/1; MG/1; MG/1; MG/1
10 TABLET ORAL 2 TIMES DAILY
Qty: 60 TABLET | Refills: 0 | Status: SHIPPED | OUTPATIENT
Start: 2025-08-21 | End: 2025-09-20

## 2025-08-18 DIAGNOSIS — R92.8 ABNORMALITY OF RIGHT BREAST ON SCREENING MAMMOGRAM: Primary | ICD-10-CM

## 2025-08-20 DIAGNOSIS — F32.A ANXIETY AND DEPRESSION: ICD-10-CM

## 2025-08-20 DIAGNOSIS — F41.9 ANXIETY AND DEPRESSION: ICD-10-CM

## 2025-08-20 RX ORDER — GABAPENTIN 100 MG/1
100 CAPSULE ORAL EVERY EVENING
Qty: 30 CAPSULE | Refills: 1 | Status: SHIPPED | OUTPATIENT
Start: 2025-08-20 | End: 2025-10-19

## 2025-08-25 ENCOUNTER — HOSPITAL ENCOUNTER (OUTPATIENT)
Dept: RADIOLOGY | Facility: CLINIC | Age: 70
Discharge: HOME | End: 2025-08-25
Payer: MEDICARE

## 2025-08-25 DIAGNOSIS — R92.8 ABNORMALITY OF RIGHT BREAST ON SCREENING MAMMOGRAM: ICD-10-CM

## 2025-08-25 PROCEDURE — G0279 TOMOSYNTHESIS, MAMMO: HCPCS | Mod: RIGHT SIDE | Performed by: RADIOLOGY

## 2025-08-25 PROCEDURE — 77061 BREAST TOMOSYNTHESIS UNI: CPT | Mod: RT

## 2025-08-25 PROCEDURE — 77065 DX MAMMO INCL CAD UNI: CPT | Mod: RIGHT SIDE | Performed by: RADIOLOGY

## 2025-08-28 DIAGNOSIS — I10 PRIMARY HYPERTENSION: ICD-10-CM

## 2025-08-29 RX ORDER — SPIRONOLACTONE 25 MG/1
25 TABLET ORAL DAILY
Qty: 90 TABLET | Refills: 0 | Status: SHIPPED | OUTPATIENT
Start: 2025-08-29

## 2025-09-11 ENCOUNTER — APPOINTMENT (OUTPATIENT)
Facility: CLINIC | Age: 70
End: 2025-09-11
Payer: MEDICARE

## 2025-10-15 ENCOUNTER — APPOINTMENT (OUTPATIENT)
Dept: OTOLARYNGOLOGY | Facility: CLINIC | Age: 70
End: 2025-10-15
Payer: MEDICARE

## 2025-10-21 ENCOUNTER — APPOINTMENT (OUTPATIENT)
Dept: PRIMARY CARE | Facility: CLINIC | Age: 70
End: 2025-10-21
Payer: MEDICARE